# Patient Record
Sex: MALE | Race: BLACK OR AFRICAN AMERICAN | Employment: FULL TIME | URBAN - METROPOLITAN AREA
[De-identification: names, ages, dates, MRNs, and addresses within clinical notes are randomized per-mention and may not be internally consistent; named-entity substitution may affect disease eponyms.]

---

## 2017-02-13 ENCOUNTER — OFFICE VISIT (OUTPATIENT)
Dept: INTERNAL MEDICINE CLINIC | Age: 32
End: 2017-02-13

## 2017-02-13 VITALS
RESPIRATION RATE: 18 BRPM | HEART RATE: 56 BPM | SYSTOLIC BLOOD PRESSURE: 142 MMHG | BODY MASS INDEX: 36.48 KG/M2 | DIASTOLIC BLOOD PRESSURE: 103 MMHG | TEMPERATURE: 96.1 F | WEIGHT: 246.3 LBS | HEIGHT: 69 IN

## 2017-02-13 DIAGNOSIS — E66.01 SEVERE OBESITY (BMI 35.0-35.9 WITH COMORBIDITY) (HCC): ICD-10-CM

## 2017-02-13 DIAGNOSIS — R73.03 PREDIABETES: ICD-10-CM

## 2017-02-13 DIAGNOSIS — I10 UNCONTROLLED HYPERTENSION: Primary | ICD-10-CM

## 2017-02-13 DIAGNOSIS — E78.2 MIXED DYSLIPIDEMIA: ICD-10-CM

## 2017-02-13 DIAGNOSIS — R63.5 WEIGHT GAIN: ICD-10-CM

## 2017-02-13 DIAGNOSIS — E78.1 HIGH TRIGLYCERIDES: ICD-10-CM

## 2017-02-13 LAB
BILIRUB UR QL STRIP: NEGATIVE
GLUCOSE UR-MCNC: NEGATIVE MG/DL
KETONES P FAST UR STRIP-MCNC: NEGATIVE MG/DL
PH UR STRIP: 6 [PH] (ref 4.6–8)
PROT UR QL STRIP: NEGATIVE MG/DL
SP GR UR STRIP: 1.01 (ref 1–1.03)
UA UROBILINOGEN AMB POC: NORMAL (ref 0.2–1)
URINALYSIS CLARITY POC: CLEAR
URINALYSIS COLOR POC: NORMAL
URINE BLOOD POC: NEGATIVE
URINE LEUKOCYTES POC: NEGATIVE
URINE NITRITES POC: NEGATIVE

## 2017-02-13 RX ORDER — METOPROLOL TARTRATE 100 MG/1
TABLET ORAL
Qty: 60 TAB | Refills: 3 | Status: SHIPPED | OUTPATIENT
Start: 2017-02-13 | End: 2018-03-15

## 2017-02-13 RX ORDER — CHLORTHALIDONE 25 MG/1
25 TABLET ORAL DAILY
Qty: 30 TAB | Refills: 3 | Status: SHIPPED | OUTPATIENT
Start: 2017-02-13 | End: 2018-11-12

## 2017-02-13 RX ORDER — ATORVASTATIN CALCIUM 40 MG/1
40 TABLET, FILM COATED ORAL DAILY
Qty: 30 TAB | Refills: 3 | Status: SHIPPED | OUTPATIENT
Start: 2017-02-13 | End: 2018-03-15

## 2017-02-13 RX ORDER — CHLORTHALIDONE 25 MG/1
25 TABLET ORAL DAILY
Qty: 30 TAB | Refills: 3 | Status: CANCELLED | OUTPATIENT
Start: 2017-02-13

## 2017-02-13 RX ORDER — LISINOPRIL 40 MG/1
40 TABLET ORAL DAILY
Qty: 30 TAB | Refills: 3 | Status: SHIPPED | OUTPATIENT
Start: 2017-02-13 | End: 2018-03-15

## 2017-02-13 RX ORDER — AMLODIPINE BESYLATE 10 MG/1
10 TABLET ORAL DAILY
Qty: 30 TAB | Refills: 3 | Status: SHIPPED | OUTPATIENT
Start: 2017-02-13 | End: 2018-03-15

## 2017-02-13 RX ORDER — LISINOPRIL 40 MG/1
40 TABLET ORAL DAILY
Qty: 30 TAB | Refills: 3 | Status: CANCELLED | OUTPATIENT
Start: 2017-02-13

## 2017-02-13 RX ORDER — METOPROLOL TARTRATE 100 MG/1
TABLET ORAL
Qty: 60 TAB | Refills: 3 | Status: CANCELLED | OUTPATIENT
Start: 2017-02-13

## 2017-02-13 RX ORDER — AMLODIPINE BESYLATE 10 MG/1
10 TABLET ORAL DAILY
Qty: 30 TAB | Refills: 3 | Status: CANCELLED | OUTPATIENT
Start: 2017-02-13

## 2017-02-13 NOTE — PROGRESS NOTES
Chief Complaint   Patient presents with    Cholesterol Problem     Rm 7    Hypertension     Pt denies, blurred vision/dizziness/chest pain           Subjective:   Chasidy Cruz. 33 y.o.  male with a  past medical history reviewed see below. comes in today for a bp check   Mild ha 3/10 stress with insurance company took bp meds as directed and drank water he is exercising abit more 1-2 times a week. ROS: otherwise feeling generally well. All other systems reviewed and are negative      Current Outpatient Prescriptions   Medication Sig Dispense Refill    atorvastatin (LIPITOR) 40 mg tablet Take 1 Tab by mouth daily. 30 Tab 3    metoprolol tartrate (LOPRESSOR) 100 mg IR tablet Take 100 in the am and 50 in the pm 60 Tab 3    amLODIPine (NORVASC) 10 mg tablet Take 1 Tab by mouth daily. 30 Tab 3    lisinopril (PRINIVIL, ZESTRIL) 40 mg tablet Take 1 Tab by mouth daily. 30 Tab 3    chlorthalidone (HYGROTEN) 25 mg tablet Take 1 Tab by mouth daily. 30 Tab 3    clotrimazole (LOTRIMIN) 1 % topical cream Apply  to affected area two (2) times a day. 45 g 0     No Known Allergies  Past Medical History:   Diagnosis Date    Hypertension      History reviewed. No pertinent surgical history.   Family History   Problem Relation Age of Onset    Hypertension Mother     Hypertension Father      Social History   Substance Use Topics    Smoking status: Former Smoker     Packs/day: 0.50     Years: 4.00     Quit date: 5/1/2014    Smokeless tobacco: Never Used    Alcohol use Yes      Comment: rarely          Objective:     Visit Vitals    BP (!) 142/103 (BP 1 Location: Left arm, BP Patient Position: Sitting)    Pulse (!) 56    Temp 96.1 °F (35.6 °C) (Oral)    Resp 18    Ht 5' 9\" (1.753 m)    Wt 246 lb 4.8 oz (111.7 kg)    BMI 36.37 kg/m2     Gen: NAD, pleasant  HEENT: normal appearing head, nares patent, PERRLA, EOMI, oropharynx no erythema, no cervical lymphadenopathy neck supple   Cardio: RRR nl S1S2 no murmur  Lungs CTAB no wheeze no rales no rhonchi  ABD Soft non tender non distended + bowel sounds  Extremities: full ROM X 4 no clubbing no cyanosis  Neuro: no gross focal deficits noted, alert and orientated X 3  Psych.: well groomed no outward signs of depression. Assessment/Plan:   Tosha Donahue was seen today for cholesterol problem and hypertension. Diagnoses and all orders for this visit:    Uncontrolled hypertension  -     AMB POC URINALYSIS DIP STICK AUTO W/O MICRO  -     METABOLIC PANEL, COMPREHENSIVE    Mixed dyslipidemia  -     LIPID PANEL  -     METABOLIC PANEL, COMPREHENSIVE  -     TSH 3RD GENERATION    Weight gain    Severe obesity (BMI 35.0-35.9 with comorbidity) ([facility])  -     LIPID PANEL  -     METABOLIC PANEL, COMPREHENSIVE  -     TSH 3RD GENERATION    Prediabetes    High triglycerides  -     atorvastatin (LIPITOR) 40 mg tablet; Take 1 Tab by mouth daily. Other orders  -     Cancel: metoprolol tartrate (LOPRESSOR) 100 mg IR tablet; Take 100 in the am and 50 in the pm  -     Cancel: amLODIPine (NORVASC) 10 mg tablet; Take 1 Tab by mouth daily. -     Cancel: lisinopril (PRINIVIL, ZESTRIL) 40 mg tablet; Take 1 Tab by mouth daily. -     Cancel: chlorthalidone (HYGROTEN) 25 mg tablet; Take 1 Tab by mouth daily. -     metoprolol tartrate (LOPRESSOR) 100 mg IR tablet; Take 100 in the am and 50 in the pm  -     amLODIPine (NORVASC) 10 mg tablet; Take 1 Tab by mouth daily. -     lisinopril (PRINIVIL, ZESTRIL) 40 mg tablet; Take 1 Tab by mouth daily. -     chlorthalidone (HYGROTEN) 25 mg tablet; Take 1 Tab by mouth daily. Follow-up Disposition:  Return in about 2 weeks (around 2/27/2017) for review labs and recheck bp 15 min . Carl Solis avs printed and given to the pt. .    The patient voiced understanding of the above. Medication side effects were reviewed with the patient. Call with any concerns.

## 2017-02-13 NOTE — PROGRESS NOTES
Preethi Carranza. is a 33 y.o. male  Chief Complaint   Patient presents with    Cholesterol Problem     Rm 7    Hypertension     Pt denies, blurred vision/dizziness/chest pain     1. Have you been to an emergency room, urgent clinic, or hospitalized since your last visit? NO  If yes, where when, and reason for visit? 2. Have seen or consulted any other health care provider since your last visit? NO  Please include any pap smears or colon screening in this section  If yes, where when, and reason for visit? 6. Do you have an Advanced Directive/ Living Will in place?  NO  If yes, do we have a copy on file NO  If no, would you like information NO

## 2017-02-13 NOTE — MR AVS SNAPSHOT
Visit Information Date & Time Provider Department Dept. Phone Encounter #  
 2/13/2017  9:00  [facility] [place], [place] [phone] [id number] Follow-up Instructions Return in about 2 weeks (around 2/27/2017) for review labs and recheck bp 15 min . Upcoming Health Maintenance Date Due DTaP/Tdap/Td series (2 - Td) 4/29/2019 Allergies as of 2/13/2017  Review Complete On: 2/13/2017 By: Susanne Stoll LPN No Known Allergies Current Immunizations  Reviewed on 11/10/2016 Name Date Tdap 4/29/2009 Not reviewed this visit You Were Diagnosed With   
  
 Codes Comments Uncontrolled hypertension    -  Primary ICD-10-CM: I10 
ICD-9-CM: 401.9 Mixed dyslipidemia     ICD-10-CM: E78.2 ICD-9-CM: 272.2 Weight gain     ICD-10-CM: R63.5 ICD-9-CM: 783.1 Severe obesity (BMI 35.0-35.9 with comorbidity) (HCC)     ICD-10-CM: E66.01, Z68.35 ICD-9-CM: 278.01, V85.35 Prediabetes     ICD-10-CM: R73.03 
ICD-9-CM: 790.29 Vitals BP Pulse Temp Resp Height(growth percentile) Weight(growth percentile) (!) 142/103 (BP 1 Location: Left arm, BP Patient Position: Sitting) (!) 56 96.1 °F (35.6 °C) (Oral) 18 5' 9\" (1.753 m) 246 lb 4.8 oz (111.7 kg) BMI Smoking Status 36.37 kg/m2 Former Smoker Vitals History BMI and BSA Data Body Mass Index Body Surface Area  
 36.37 kg/m 2 2.33 m 2 Preferred Pharmacy Pharmacy Name Phone [facility] [address] [place] [address] [phone] Your Updated Medication List  
  
   
This list is accurate as of: 2/13/17 10:22 AM.  Always use your most recent med list. amLODIPine 10 mg tablet Commonly known as:  Colin Chafe Take 1 Tab by mouth daily. atorvastatin 40 mg tablet Commonly known as:  LIPITOR Take 1 Tab by mouth daily. chlorthalidone 25 mg tablet Commonly known as:  Saeed Males Take 1 Tab by mouth daily. clotrimazole 1 % topical cream  
Commonly known as:  Colby Hathawayn Apply  to affected area two (2) times a day. lisinopril 40 mg tablet Commonly known as:  Judi Needy Take 1 Tab by mouth daily. metoprolol tartrate 100 mg IR tablet Commonly known as:  LOPRESSOR Take 100 in the am and 50 in the pm  
  
  
  
  
We Performed the Following AMB POC URINALYSIS DIP STICK AUTO W/O MICRO [70165 CPT(R)] LIPID PANEL [86840 CPT(R)] METABOLIC PANEL, COMPREHENSIVE [71765 CPT(R)] TSH 3RD GENERATION [14967 CPT(R)] Follow-up Instructions Return in about 2 weeks (around 2/27/2017) for review labs and recheck bp 15 min . Introducing [facility]! Dear Adam Moore: Thank you for requesting a Evolita account. Our records indicate that you have previously registered for a Evolita account but its currently inactive. Please call our Evolita support line at [phone]. Additional Information If you have questions, please visit the Frequently Asked Questions section of the Evolita website at [email or web address] Remember, Evolita is NOT to be used for urgent needs. For medical emergencies, dial 911. Now available from your iPhone and Android! Please provide this summary of care documentation to your next provider. Your primary care clinician is listed as Dayday Napoles. If you have any questions after today's visit, please call [phone].

## 2017-02-27 ENCOUNTER — OFFICE VISIT (OUTPATIENT)
Dept: INTERNAL MEDICINE CLINIC | Age: 32
End: 2017-02-27

## 2017-02-27 VITALS
RESPIRATION RATE: 16 BRPM | HEART RATE: 70 BPM | TEMPERATURE: 98.2 F | OXYGEN SATURATION: 98 % | WEIGHT: 246.5 LBS | SYSTOLIC BLOOD PRESSURE: 145 MMHG | HEIGHT: 69 IN | BODY MASS INDEX: 36.51 KG/M2 | DIASTOLIC BLOOD PRESSURE: 105 MMHG

## 2017-02-27 DIAGNOSIS — F12.90 MARIJUANA SMOKER, EPISODIC: ICD-10-CM

## 2017-02-27 DIAGNOSIS — R94.31 ELECTROCARDIOGRAM SHOWING T WAVE ABNORMALITIES: ICD-10-CM

## 2017-02-27 DIAGNOSIS — I10 UNCONTROLLED HYPERTENSION: Primary | ICD-10-CM

## 2017-02-27 DIAGNOSIS — E66.01 SEVERE OBESITY (BMI 35.0-35.9 WITH COMORBIDITY) (HCC): ICD-10-CM

## 2017-02-27 LAB
BUN SERPL-MCNC: 12 MG/DL (ref 6–20)
BUN/CREAT SERPL: 10 (ref 8–19)
CALCIUM SERPL-MCNC: 9 MG/DL (ref 8.7–10.2)
CHLORIDE SERPL-SCNC: 98 MMOL/L (ref 96–106)
CO2 SERPL-SCNC: 25 MMOL/L (ref 18–29)
CREAT SERPL-MCNC: 1.15 MG/DL (ref 0.76–1.27)
GLUCOSE SERPL-MCNC: 91 MG/DL (ref 65–99)
POTASSIUM SERPL-SCNC: 3.9 MMOL/L (ref 3.5–5.2)
SODIUM SERPL-SCNC: 139 MMOL/L (ref 134–144)

## 2017-02-27 NOTE — PATIENT INSTRUCTIONS

## 2017-02-27 NOTE — PROGRESS NOTES
Chief Complaint   Patient presents with    Blood Pressure Check       Subjective:   Josr Carvalho. 33 y.o.  male with a  past medical history reviewed see below. comes in today for a bp check. Her for a bp check not using medication as directed and has missed a several doses of medication. He has not been doing th dash diet   He has also been drinking beer had three yesterday. ROS: otherwise feeling generally well. All other systems reviewed and are negative    Current Outpatient Prescriptions   Medication Sig Dispense Refill    atorvastatin (LIPITOR) 40 mg tablet Take 1 Tab by mouth daily. 30 Tab 3    metoprolol tartrate (LOPRESSOR) 100 mg IR tablet Take 100 in the am and 50 in the pm 60 Tab 3    amLODIPine (NORVASC) 10 mg tablet Take 1 Tab by mouth daily. 30 Tab 3    lisinopril (PRINIVIL, ZESTRIL) 40 mg tablet Take 1 Tab by mouth daily. 30 Tab 3    chlorthalidone (HYGROTEN) 25 mg tablet Take 1 Tab by mouth daily. 30 Tab 3    clotrimazole (LOTRIMIN) 1 % topical cream Apply  to affected area two (2) times a day. 45 g 0     No Known Allergies  Past Medical History:   Diagnosis Date    Hypertension      History reviewed. No pertinent surgical history.   Family History   Problem Relation Age of Onset    Hypertension Mother     Hypertension Father      Social History   Substance Use Topics    Smoking status: Former Smoker     Packs/day: 0.50     Years: 4.00     Quit date: 5/1/2014    Smokeless tobacco: Never Used    Alcohol use Yes      Comment: rarely          Objective:     Visit Vitals    BP (!) 145/105 (BP 1 Location: Left arm, BP Patient Position: Sitting)    Pulse 70    Temp 98.2 °F (36.8 °C) (Oral)    Resp 16    Ht 5' 9\" (1.753 m)    Wt 246 lb 8 oz (111.8 kg)    SpO2 98%    BMI 36.4 kg/m2     Gen: NAD, pleasant  HEENT: normal appearing head, nares patent, PERRLA, EOMI, oropharynx no erythema, no cervical lymphadenopathy neck supple   Cardio: RRR nl S1S2 no murmur  Lungs CTAB no wheeze no rales no rhonchi  ABD Soft non tender non distended + bowel sounds  Extremities: full ROM X 4 no clubbing no cyanosis  Neuro: no gross focal deficits noted, alert and orientated X 3  Psych.: well groomed no outward signs of depression. Assessment/Plan:   Josue Schafer was seen today for blood pressure check. Diagnoses and all orders for this visit:    Uncontrolled hypertension  -     AMB POC EKG ROUTINE W/ 12 LEADS, INTER & REP  -     UA/M W/RFLX CULTURE, COMP  -     METABOLIC PANEL, BASIC    Severe obesity (BMI 35.0-35.9 with comorbidity) ([facility])  -     AMB POC EKG ROUTINE W/ 12 LEADS, INTER & REP    Marijuana smoker, episodic ([facility])    Electrocardiogram showing T wave abnormalities  -     METABOLIC PANEL, BASIC      Follow-up Disposition:  Return in about 2 days (around 3/1/2017) for review labs and recheck bp 15 min . Carlos Lemons avs printed and given to the pt. .    The patient voiced understanding of the above. Medication side effects were reviewed with the patient. Call with any concerns.

## 2017-02-27 NOTE — MR AVS SNAPSHOT
Visit Information Date & Time Provider Department Dept. Phone Encounter #  
 2/27/2017  9:30  [facility] [place], [facility] [phone] [id number] Follow-up Instructions Return in about 2 days (around 3/1/2017) for review labs and recheck bp 15 min . Upcoming Health Maintenance Date Due DTaP/Tdap/Td series (2 - Td) 4/29/2019 Allergies as of 2/27/2017  Review Complete On: 2/27/2017 By: Bhumi Ferrara No Known Allergies Current Immunizations  Reviewed on 11/10/2016 Name Date Tdap 4/29/2009 Not reviewed this visit You Were Diagnosed With   
  
 Codes Comments Uncontrolled hypertension    -  Primary ICD-10-CM: I10 
ICD-9-CM: 401.9 Severe obesity (BMI 35.0-35.9 with comorbidity) (HCC)     ICD-10-CM: E66.01, Z68.35 ICD-9-CM: 278.01, V85.35 Marijuana smoker, episodic ([facility] [address]     ICD-10-CM: F12.20 ICD-9-CM: 305.22 Electrocardiogram showing T wave abnormalities     ICD-10-CM: R94.31 
ICD-9-CM: 794.31 Vitals BP  
  
  
  
  
  
 (!) 145/105 (BP 1 Location: Left arm, BP Patient Position: Sitting) Vitals History BMI and BSA Data Body Mass Index Body Surface Area  
 36.4 kg/m 2 2.33 m 2 Preferred Pharmacy Pharmacy Name Phone [facility] [address] [phone] Your Updated Medication List  
  
   
This list is accurate as of: 2/27/17 10:41 AM.  Always use your most recent med list. amLODIPine 10 mg tablet Commonly known as:  Yue Citron Take 1 Tab by mouth daily. atorvastatin 40 mg tablet Commonly known as:  LIPITOR Take 1 Tab by mouth daily. chlorthalidone 25 mg tablet Commonly known as:  Marnette Castellani Take 1 Tab by mouth daily. clotrimazole 1 % topical cream  
Commonly known as:  Kristofer Frames Apply  to affected area two (2) times a day. lisinopril 40 mg tablet Commonly known as:  Alida Queenie Take 1 Tab by mouth daily. metoprolol tartrate 100 mg IR tablet Commonly known as:  LOPRESSOR Take 100 in the am and 50 in the pm  
  
  
  
  
We Performed the Following AMB POC EKG ROUTINE W/ 12 LEADS, INTER & REP [13774 CPT(R)] METABOLIC PANEL, BASIC [49332 CPT(R)] UA/M W/RFLX CULTURE, COMP [17283 CPT(R)] Follow-up Instructions Return in about 2 days (around 3/1/2017) for review labs and recheck bp 15 min . Patient Instructions DASH Diet: Care Instructions Your Care Instructions The DASH diet is an eating plan that can help lower your blood pressure. DASH stands for Dietary Approaches to Stop Hypertension. Hypertension is high blood pressure. The DASH diet focuses on eating foods that are high in calcium, potassium, and magnesium. These nutrients can lower blood pressure. The foods that are highest in these nutrients are fruits, vegetables, low-fat dairy products, nuts, seeds, and legumes. But taking calcium, potassium, and magnesium supplements instead of eating foods that are high in those nutrients does not have the same effect. The DASH diet also includes whole grains, fish, and poultry. The DASH diet is one of several lifestyle changes your doctor may recommend to lower your high blood pressure. Your doctor may also want you to decrease the amount of sodium in your diet. Lowering sodium while following the DASH diet can lower blood pressure even further than just the DASH diet alone. Follow-up care is a key part of your treatment and safety. Be sure to make and go to all appointments, and call your doctor if you are having problems. It's also a good idea to know your test results and keep a list of the medicines you take. How can you care for yourself at home? Following the DASH diet · Eat 4 to 5 servings of fruit each day.  A serving is 1 medium-sized piece of fruit, ½ cup chopped or canned fruit, 1/4 cup dried fruit, or 4 ounces (½ cup) of fruit juice. Choose fruit more often than fruit juice. · Eat 4 to 5 servings of vegetables each day. A serving is 1 cup of lettuce or raw leafy vegetables, ½ cup of chopped or cooked vegetables, or 4 ounces (½ cup) of vegetable juice. Choose vegetables more often than vegetable juice. · Get 2 to 3 servings of low-fat and fat-free dairy each day. A serving is 8 ounces of milk, 1 cup of yogurt, or 1 ½ ounces of cheese. · Eat 6 to 8 servings of grains each day. A serving is 1 slice of bread, 1 ounce of dry cereal, or ½ cup of cooked rice, pasta, or cooked cereal. Try to choose whole-grain products as much as possible. · Limit lean meat, poultry, and fish to 2 servings each day. A serving is 3 ounces, about the size of a deck of cards. · Eat 4 to 5 servings of nuts, seeds, and legumes (cooked dried beans, lentils, and split peas) each week. A serving is 1/3 cup of nuts, 2 tablespoons of seeds, or ½ cup of cooked beans or peas. · Limit fats and oils to 2 to 3 servings each day. A serving is 1 teaspoon of vegetable oil or 2 tablespoons of salad dressing. · Limit sweets and added sugars to 5 servings or less a week. A serving is 1 tablespoon jelly or jam, ½ cup sorbet, or 1 cup of lemonade. · Eat less than 2,300 milligrams (mg) of sodium a day. If you limit your sodium to 1,500 mg a day, you can lower your blood pressure even more. Tips for success · Start small. Do not try to make dramatic changes to your diet all at once. You might feel that you are missing out on your favorite foods and then be more likely to not follow the plan. Make small changes, and stick with them. Once those changes become habit, add a few more changes. · Try some of the following: ¨ Make it a goal to eat a fruit or vegetable at every meal and at snacks. This will make it easy to get the recommended amount of fruits and vegetables each day. ¨ Try yogurt topped with fruit and nuts for a snack or healthy dessert. ¨ Add lettuce, tomato, cucumber, and onion to sandwiches. ¨ Combine a ready-made pizza crust with low-fat mozzarella cheese and lots of vegetable toppings. Try using tomatoes, squash, spinach, broccoli, carrots, cauliflower, and onions. ¨ Have a variety of cut-up vegetables with a low-fat dip as an appetizer instead of chips and dip. ¨ Sprinkle sunflower seeds or chopped almonds over salads. Or try adding chopped walnuts or almonds to cooked vegetables. ¨ Try some vegetarian meals using beans and peas. Add garbanzo or kidney beans to salads. Make burritos and tacos with mashed serra beans or black beans. Where can you learn more? Go to http://nakul-chel.info/. Enter X737 in the search box to learn more about \"DASH Diet: Care Instructions. \" Current as of: March 23, 2016 Content Version: 11.1 © 6434-5824 [employer] Hitch. Care instructions adapted under license by [employer] (which disclaims liability or warranty for this information). If you have questions about a medical condition or this instruction, always ask your healthcare professional. [address] any warranty or liability for your use of this information. Introducing hospitals & HEALTH SERVICES! Dear Marissa Razo: Thank you for requesting a Hashable account. Our records indicate that you have previously registered for a Hashable account but its currently inactive. Please call our Hashable support line at [phone]. Additional Information If you have questions, please visit the Frequently Asked Questions section of the Hashable website at [email or web address] Remember, Hashable is NOT to be used for urgent needs. For medical emergencies, dial 911. Now available from your iPhone and Android! Please provide this summary of care documentation to your next provider. Your primary care clinician is listed as Neha Ascencio. If you have any questions after today's visit, please call [phone].

## 2017-02-28 LAB
APPEARANCE UR: CLEAR
BACTERIA #/AREA URNS HPF: NORMAL /[HPF]
BILIRUB UR QL STRIP: NEGATIVE
CASTS URNS QL MICRO: NORMAL /LPF
COLOR UR: YELLOW
EPI CELLS #/AREA URNS HPF: NORMAL /HPF
GLUCOSE UR QL: NEGATIVE
HGB UR QL STRIP: NEGATIVE
KETONES UR QL STRIP: NEGATIVE
LEUKOCYTE ESTERASE UR QL STRIP: NEGATIVE
MICRO URNS: NORMAL
MICRO URNS: NORMAL
NITRITE UR QL STRIP: NEGATIVE
PH UR STRIP: 7 [PH] (ref 5–7.5)
PROT UR QL STRIP: NORMAL
RBC #/AREA URNS HPF: NORMAL /HPF
SP GR UR: 1.01 (ref 1–1.03)
URINALYSIS REFLEX , 377201: NORMAL
UROBILINOGEN UR STRIP-MCNC: 0.2 MG/DL (ref 0.2–1)
WBC #/AREA URNS HPF: NORMAL /HPF

## 2018-03-15 ENCOUNTER — APPOINTMENT (OUTPATIENT)
Dept: CT IMAGING | Age: 33
End: 2018-03-15
Attending: EMERGENCY MEDICINE
Payer: SELF-PAY

## 2018-03-15 ENCOUNTER — HOSPITAL ENCOUNTER (EMERGENCY)
Age: 33
Discharge: HOME | End: 2018-03-16
Attending: EMERGENCY MEDICINE
Payer: SELF-PAY

## 2018-03-15 VITALS
WEIGHT: 232.14 LBS | TEMPERATURE: 98.9 F | SYSTOLIC BLOOD PRESSURE: 176 MMHG | DIASTOLIC BLOOD PRESSURE: 108 MMHG | HEIGHT: 69 IN | BODY MASS INDEX: 34.38 KG/M2 | HEART RATE: 92 BPM | RESPIRATION RATE: 20 BRPM | OXYGEN SATURATION: 98 %

## 2018-03-15 DIAGNOSIS — E83.42 HYPOMAGNESEMIA: ICD-10-CM

## 2018-03-15 DIAGNOSIS — E27.8 ADRENAL NODULE (HCC): ICD-10-CM

## 2018-03-15 DIAGNOSIS — I10 ACCELERATED HYPERTENSION: ICD-10-CM

## 2018-03-15 DIAGNOSIS — E87.6 HYPOKALEMIA: ICD-10-CM

## 2018-03-15 DIAGNOSIS — R10.9 ACUTE ABDOMINAL PAIN: Primary | ICD-10-CM

## 2018-03-15 DIAGNOSIS — E78.1 HIGH TRIGLYCERIDES: ICD-10-CM

## 2018-03-15 DIAGNOSIS — R11.2 NAUSEA AND VOMITING, INTRACTABILITY OF VOMITING NOT SPECIFIED, UNSPECIFIED VOMITING TYPE: ICD-10-CM

## 2018-03-15 LAB
ALBUMIN SERPL-MCNC: 3.9 G/DL (ref 3.5–5)
ALBUMIN/GLOB SERPL: 0.9 {RATIO} (ref 1.1–2.2)
ALP SERPL-CCNC: 63 U/L (ref 45–117)
ALT SERPL-CCNC: 27 U/L (ref 12–78)
ANION GAP SERPL CALC-SCNC: 7 MMOL/L (ref 5–15)
APPEARANCE UR: CLEAR
AST SERPL-CCNC: 22 U/L (ref 15–37)
BACTERIA URNS QL MICRO: NEGATIVE /HPF
BASOPHILS # BLD: 0 K/UL (ref 0–0.1)
BASOPHILS NFR BLD: 0 % (ref 0–1)
BILIRUB SERPL-MCNC: 0.5 MG/DL (ref 0.2–1)
BILIRUB UR QL: NEGATIVE
BUN SERPL-MCNC: 10 MG/DL (ref 6–20)
BUN/CREAT SERPL: 8 (ref 12–20)
CALCIUM SERPL-MCNC: 9.2 MG/DL (ref 8.5–10.1)
CHLORIDE SERPL-SCNC: 99 MMOL/L (ref 97–108)
CO2 SERPL-SCNC: 29 MMOL/L (ref 21–32)
COLOR UR: ABNORMAL
CREAT SERPL-MCNC: 1.25 MG/DL (ref 0.7–1.3)
DIFFERENTIAL METHOD BLD: ABNORMAL
EOSINOPHIL # BLD: 0 K/UL (ref 0–0.4)
EOSINOPHIL NFR BLD: 0 % (ref 0–7)
EPITH CASTS URNS QL MICRO: ABNORMAL /LPF
ERYTHROCYTE [DISTWIDTH] IN BLOOD BY AUTOMATED COUNT: 12 % (ref 11.5–14.5)
GLOBULIN SER CALC-MCNC: 4.2 G/DL (ref 2–4)
GLUCOSE SERPL-MCNC: 113 MG/DL (ref 65–100)
GLUCOSE UR STRIP.AUTO-MCNC: NEGATIVE MG/DL
HCT VFR BLD AUTO: 45.9 % (ref 36.6–50.3)
HGB BLD-MCNC: 15.8 G/DL (ref 12.1–17)
HGB UR QL STRIP: NEGATIVE
HYALINE CASTS URNS QL MICRO: ABNORMAL /LPF (ref 0–5)
IMM GRANULOCYTES # BLD: 0.1 K/UL (ref 0–0.04)
IMM GRANULOCYTES NFR BLD AUTO: 1 % (ref 0–0.5)
KETONES UR QL STRIP.AUTO: NEGATIVE MG/DL
LACTATE SERPL-SCNC: 1.9 MMOL/L (ref 0.4–2)
LEUKOCYTE ESTERASE UR QL STRIP.AUTO: NEGATIVE
LIPASE SERPL-CCNC: 345 U/L (ref 73–393)
LYMPHOCYTES # BLD: 0.6 K/UL (ref 0.8–3.5)
LYMPHOCYTES NFR BLD: 7 % (ref 12–49)
MAGNESIUM SERPL-MCNC: 1.5 MG/DL (ref 1.6–2.4)
MCH RBC QN AUTO: 28.1 PG (ref 26–34)
MCHC RBC AUTO-ENTMCNC: 34.4 G/DL (ref 30–36.5)
MCV RBC AUTO: 81.7 FL (ref 80–99)
MONOCYTES # BLD: 0.3 K/UL (ref 0–1)
MONOCYTES NFR BLD: 4 % (ref 5–13)
NEUTS SEG # BLD: 7.4 K/UL (ref 1.8–8)
NEUTS SEG NFR BLD: 88 % (ref 32–75)
NITRITE UR QL STRIP.AUTO: NEGATIVE
NRBC # BLD: 0 K/UL (ref 0–0.01)
NRBC BLD-RTO: 0 PER 100 WBC
PH UR STRIP: 6.5 [PH] (ref 5–8)
PLATELET # BLD AUTO: 256 K/UL (ref 150–400)
PMV BLD AUTO: 10.5 FL (ref 8.9–12.9)
POTASSIUM SERPL-SCNC: 3 MMOL/L (ref 3.5–5.1)
POTASSIUM SERPL-SCNC: 3.3 MMOL/L (ref 3.5–5.1)
PROT SERPL-MCNC: 8.1 G/DL (ref 6.4–8.2)
PROT UR STRIP-MCNC: ABNORMAL MG/DL
RBC # BLD AUTO: 5.62 M/UL (ref 4.1–5.7)
RBC #/AREA URNS HPF: ABNORMAL /HPF (ref 0–5)
RBC MORPH BLD: ABNORMAL
SODIUM SERPL-SCNC: 135 MMOL/L (ref 136–145)
SP GR UR REFRACTOMETRY: 1.02 (ref 1–1.03)
UROBILINOGEN UR QL STRIP.AUTO: 0.2 EU/DL (ref 0.2–1)
WBC # BLD AUTO: 8.4 K/UL (ref 4.1–11.1)
WBC URNS QL MICRO: ABNORMAL /HPF (ref 0–4)

## 2018-03-15 PROCEDURE — 83735 ASSAY OF MAGNESIUM: CPT | Performed by: EMERGENCY MEDICINE

## 2018-03-15 PROCEDURE — 84132 ASSAY OF SERUM POTASSIUM: CPT | Performed by: EMERGENCY MEDICINE

## 2018-03-15 PROCEDURE — 83690 ASSAY OF LIPASE: CPT | Performed by: EMERGENCY MEDICINE

## 2018-03-15 PROCEDURE — 81001 URINALYSIS AUTO W/SCOPE: CPT | Performed by: EMERGENCY MEDICINE

## 2018-03-15 PROCEDURE — 85025 COMPLETE CBC W/AUTO DIFF WBC: CPT | Performed by: EMERGENCY MEDICINE

## 2018-03-15 PROCEDURE — 96376 TX/PRO/DX INJ SAME DRUG ADON: CPT

## 2018-03-15 PROCEDURE — 99283 EMERGENCY DEPT VISIT LOW MDM: CPT

## 2018-03-15 PROCEDURE — 80053 COMPREHEN METABOLIC PANEL: CPT | Performed by: EMERGENCY MEDICINE

## 2018-03-15 PROCEDURE — 96365 THER/PROPH/DIAG IV INF INIT: CPT

## 2018-03-15 PROCEDURE — 74011250636 HC RX REV CODE- 250/636: Performed by: EMERGENCY MEDICINE

## 2018-03-15 PROCEDURE — 74011000250 HC RX REV CODE- 250: Performed by: EMERGENCY MEDICINE

## 2018-03-15 PROCEDURE — 74011636320 HC RX REV CODE- 636/320: Performed by: EMERGENCY MEDICINE

## 2018-03-15 PROCEDURE — 74177 CT ABD & PELVIS W/CONTRAST: CPT

## 2018-03-15 PROCEDURE — 96361 HYDRATE IV INFUSION ADD-ON: CPT

## 2018-03-15 PROCEDURE — 96375 TX/PRO/DX INJ NEW DRUG ADDON: CPT

## 2018-03-15 PROCEDURE — 83605 ASSAY OF LACTIC ACID: CPT | Performed by: EMERGENCY MEDICINE

## 2018-03-15 PROCEDURE — 36415 COLL VENOUS BLD VENIPUNCTURE: CPT | Performed by: EMERGENCY MEDICINE

## 2018-03-15 RX ORDER — MAGNESIUM SULFATE HEPTAHYDRATE 40 MG/ML
2 INJECTION, SOLUTION INTRAVENOUS
Status: COMPLETED | OUTPATIENT
Start: 2018-03-15 | End: 2018-03-16

## 2018-03-15 RX ORDER — FENTANYL CITRATE 50 UG/ML
100 INJECTION, SOLUTION INTRAMUSCULAR; INTRAVENOUS
Status: COMPLETED | OUTPATIENT
Start: 2018-03-15 | End: 2018-03-15

## 2018-03-15 RX ORDER — METOPROLOL TARTRATE 100 MG/1
TABLET ORAL
Qty: 60 TAB | Refills: 0 | Status: SHIPPED | OUTPATIENT
Start: 2018-03-15 | End: 2018-11-12

## 2018-03-15 RX ORDER — LISINOPRIL 40 MG/1
40 TABLET ORAL DAILY
Qty: 30 TAB | Refills: 0 | Status: SHIPPED | OUTPATIENT
Start: 2018-03-15 | End: 2018-11-12

## 2018-03-15 RX ORDER — SODIUM CHLORIDE 9 MG/ML
50 INJECTION, SOLUTION INTRAVENOUS
Status: COMPLETED | OUTPATIENT
Start: 2018-03-15 | End: 2018-03-16

## 2018-03-15 RX ORDER — POTASSIUM CHLORIDE 750 MG/1
10 TABLET, FILM COATED, EXTENDED RELEASE ORAL 2 TIMES DAILY
Qty: 10 TAB | Refills: 0 | Status: SHIPPED | OUTPATIENT
Start: 2018-03-15 | End: 2018-03-15

## 2018-03-15 RX ORDER — ONDANSETRON 4 MG/1
4 TABLET, ORALLY DISINTEGRATING ORAL
Qty: 10 TAB | Refills: 0 | Status: SHIPPED | OUTPATIENT
Start: 2018-03-15 | End: 2018-03-15

## 2018-03-15 RX ORDER — AMLODIPINE BESYLATE 10 MG/1
10 TABLET ORAL DAILY
Qty: 30 TAB | Refills: 3 | Status: SHIPPED | OUTPATIENT
Start: 2018-03-15 | End: 2018-11-12

## 2018-03-15 RX ORDER — POTASSIUM CHLORIDE 750 MG/1
10 TABLET, FILM COATED, EXTENDED RELEASE ORAL 2 TIMES DAILY
Qty: 10 TAB | Refills: 0 | Status: SHIPPED | OUTPATIENT
Start: 2018-03-15 | End: 2018-11-12

## 2018-03-15 RX ORDER — POTASSIUM CHLORIDE 1.5 G/1.77G
40 POWDER, FOR SOLUTION ORAL
Status: COMPLETED | OUTPATIENT
Start: 2018-03-16 | End: 2018-03-16

## 2018-03-15 RX ORDER — HYDROCHLOROTHIAZIDE 12.5 MG/1
12.5 CAPSULE ORAL DAILY
Qty: 30 CAP | Refills: 0 | Status: SHIPPED | OUTPATIENT
Start: 2018-03-15 | End: 2018-11-12

## 2018-03-15 RX ORDER — ATORVASTATIN CALCIUM 40 MG/1
40 TABLET, FILM COATED ORAL DAILY
Qty: 30 TAB | Refills: 0 | Status: SHIPPED | OUTPATIENT
Start: 2018-03-15 | End: 2018-11-12

## 2018-03-15 RX ORDER — AMLODIPINE BESYLATE 10 MG/1
10 TABLET ORAL DAILY
Qty: 30 TAB | Refills: 3 | Status: SHIPPED | OUTPATIENT
Start: 2018-03-15 | End: 2018-03-15

## 2018-03-15 RX ORDER — HYDROCODONE BITARTRATE AND ACETAMINOPHEN 5; 325 MG/1; MG/1
1 TABLET ORAL
Qty: 20 TAB | Refills: 0 | Status: SHIPPED | OUTPATIENT
Start: 2018-03-15 | End: 2018-11-12

## 2018-03-15 RX ORDER — ONDANSETRON 2 MG/ML
4 INJECTION INTRAMUSCULAR; INTRAVENOUS
Status: COMPLETED | OUTPATIENT
Start: 2018-03-15 | End: 2018-03-15

## 2018-03-15 RX ORDER — HYDROCHLOROTHIAZIDE 12.5 MG/1
12.5 CAPSULE ORAL DAILY
COMMUNITY
End: 2018-03-15

## 2018-03-15 RX ORDER — SODIUM CHLORIDE 0.9 % (FLUSH) 0.9 %
10 SYRINGE (ML) INJECTION
Status: COMPLETED | OUTPATIENT
Start: 2018-03-15 | End: 2018-03-15

## 2018-03-15 RX ORDER — LABETALOL HYDROCHLORIDE 5 MG/ML
10 INJECTION, SOLUTION INTRAVENOUS
Status: COMPLETED | OUTPATIENT
Start: 2018-03-15 | End: 2018-03-15

## 2018-03-15 RX ORDER — LABETALOL HYDROCHLORIDE 5 MG/ML
5 INJECTION, SOLUTION INTRAVENOUS
Status: COMPLETED | OUTPATIENT
Start: 2018-03-15 | End: 2018-03-15

## 2018-03-15 RX ADMIN — Medication 10 ML: at 22:28

## 2018-03-15 RX ADMIN — ONDANSETRON 4 MG: 2 INJECTION INTRAMUSCULAR; INTRAVENOUS at 20:56

## 2018-03-15 RX ADMIN — FENTANYL CITRATE 100 MCG: 50 INJECTION, SOLUTION INTRAMUSCULAR; INTRAVENOUS at 20:55

## 2018-03-15 RX ADMIN — SODIUM CHLORIDE 50 ML/HR: 900 INJECTION, SOLUTION INTRAVENOUS at 22:28

## 2018-03-15 RX ADMIN — LABETALOL HYDROCHLORIDE 5 MG: 5 INJECTION INTRAVENOUS at 20:55

## 2018-03-15 RX ADMIN — MAGNESIUM SULFATE HEPTAHYDRATE 2 G: 40 INJECTION, SOLUTION INTRAVENOUS at 23:37

## 2018-03-15 RX ADMIN — IOPAMIDOL 100 ML: 755 INJECTION, SOLUTION INTRAVENOUS at 22:28

## 2018-03-15 RX ADMIN — DIATRIZOATE MEGLUMINE AND DIATRIZOATE SODIUM 30 ML: 660; 100 LIQUID ORAL; RECTAL at 20:55

## 2018-03-15 RX ADMIN — SODIUM CHLORIDE 500 ML: 900 INJECTION, SOLUTION INTRAVENOUS at 21:39

## 2018-03-15 RX ADMIN — LABETALOL HYDROCHLORIDE 10 MG: 5 INJECTION INTRAVENOUS at 21:39

## 2018-03-15 NOTE — ED NOTES
Pt reporting has been having RLQ abdominal pain since last night with associated n/v/d. Is currently a dull 5/10. Reporting that he has also had associated chills. Denies any fevers. Has vomited  4-5 times.

## 2018-03-15 NOTE — ED PROVIDER NOTES
EMERGENCY DEPARTMENT HISTORY AND PHYSICAL EXAM      Date: 3/15/2018  Patient Name: Ezra Peterson. History of Presenting Illness     Chief Complaint   Patient presents with    Abdominal Pain     with nausea & vomiting since last pm       History Provided By: Patient    HPI: Ezra Peterson., 27 y.o. male with PMHx significant for HTN, presents ambulatory to the ED with cc of a constant, 5/10 worsening to 7/10, central abdominal pain since last night. He reports associated symptoms of N/V/D and intermittent chills. He states he has had 5 episodes of emesis and 1 episode of a loose BM. Pt endorses tobacco use but denies any alcohol use. He denies a FHx of any issues. Pt denies any prior abdominal surgeries. He denies being on an anticoagulant. Pt denies back pain, fever, urinary sxs, CP, SOB, HA, or blurred vision. Of note, the pt's blood pressure is high in the ED and he states he has a h/o HTN, but denies being medicated for it. PCP: Denise Olguin MD    There are no other complaints, changes, or physical findings at this time. Current Facility-Administered Medications   Medication Dose Route Frequency Provider Last Rate Last Dose    magnesium sulfate 2 g/50 ml IVPB (premix or compounded)  2 g IntraVENous NOW Denise Washington MD 50 mL/hr at 03/15/18 2337 2 g at 03/15/18 2337    potassium chloride (KLOR-CON) packet 40 mEq  40 mEq Oral NOW Denise Washington MD         Current Outpatient Prescriptions   Medication Sig Dispense Refill    HYDROcodone-acetaminophen (NORCO) 5-325 mg per tablet Take 1 Tab by mouth every four (4) hours as needed for Pain. Max Daily Amount: 6 Tabs. 20 Tab 0    hydroCHLOROthiazide (MICROZIDE) 12.5 mg capsule Take 1 Cap by mouth daily. 30 Cap 0    amLODIPine (NORVASC) 10 mg tablet Take 1 Tab by mouth daily. 30 Tab 3    atorvastatin (LIPITOR) 40 mg tablet Take 1 Tab by mouth daily.  30 Tab 0    metoprolol tartrate (LOPRESSOR) 100 mg IR tablet Take 100 in the am and 50 in the pm 60 Tab 0    lisinopril (PRINIVIL, ZESTRIL) 40 mg tablet Take 1 Tab by mouth daily. 30 Tab 0    potassium chloride SR (KLOR-CON 10) 10 mEq tablet Take 1 Tab by mouth two (2) times a day. 10 Tab 0    chlorthalidone (HYGROTEN) 25 mg tablet Take 1 Tab by mouth daily. 30 Tab 3       Past History     Past Medical History:  Past Medical History:   Diagnosis Date    Hypertension        Past Surgical History:  History reviewed. No pertinent surgical history. Family History:  Family History   Problem Relation Age of Onset    Hypertension Mother     Hypertension Father        Social History:  Social History   Substance Use Topics    Smoking status: Former Smoker     Packs/day: 0.50     Years: 4.00     Quit date: 5/1/2014    Smokeless tobacco: Never Used    Alcohol use Yes      Comment: rarely       Allergies:  No Known Allergies      Review of Systems   Review of Systems   Constitutional: Positive for chills. Negative for fever. HENT: Negative for congestion. Eyes: Negative for visual disturbance (-blurred vision). Respiratory: Negative for shortness of breath. Cardiovascular: Negative for chest pain. Gastrointestinal: Positive for abdominal pain, diarrhea, nausea and vomiting. Endocrine: Negative for heat intolerance. Genitourinary: Negative for difficulty urinating. Musculoskeletal: Negative for back pain. Skin: Negative for rash. Allergic/Immunologic: Negative for immunocompromised state. Neurological: Negative for headaches. Hematological: Does not bruise/bleed easily. Psychiatric/Behavioral: Negative. All other systems reviewed and are negative. Physical Exam   Physical Exam   Constitutional: He is oriented to person, place, and time. He appears well-developed and well-nourished. Moderate distress   HENT:   Head: Normocephalic and atraumatic. Eyes: EOM are normal. Pupils are equal, round, and reactive to light. Neck: Normal range of motion.  Neck supple. Cardiovascular: Normal rate, regular rhythm and normal heart sounds. Pulmonary/Chest: Effort normal and breath sounds normal. He has no wheezes. Abdominal: Soft. Bowel sounds are normal. There is tenderness in the right upper quadrant, periumbilical area and left upper quadrant. Musculoskeletal: Normal range of motion. He exhibits no edema or tenderness. Neurological: He is alert and oriented to person, place, and time. No cranial nerve deficit. Skin: Skin is warm and dry. Psychiatric: He has a normal mood and affect. His behavior is normal.   Nursing note and vitals reviewed. Diagnostic Study Results     Labs -  Recent Results (from the past 12 hour(s))   LACTIC ACID    Collection Time: 03/15/18  8:15 PM   Result Value Ref Range    Lactic acid 1.9 0.4 - 2.0 MMOL/L   CBC WITH AUTOMATED DIFF    Collection Time: 03/15/18  8:24 PM   Result Value Ref Range    WBC 8.4 4.1 - 11.1 K/uL    RBC 5.62 4.10 - 5.70 M/uL    HGB 15.8 12.1 - 17.0 g/dL    HCT 45.9 36.6 - 50.3 %    MCV 81.7 80.0 - 99.0 FL    MCH 28.1 26.0 - 34.0 PG    MCHC 34.4 30.0 - 36.5 g/dL    RDW 12.0 11.5 - 14.5 %    PLATELET 378 056 - 106 K/uL    MPV 10.5 8.9 - 12.9 FL    NRBC 0.0 0  WBC    ABSOLUTE NRBC 0.00 0.00 - 0.01 K/uL    NEUTROPHILS 88 (H) 32 - 75 %    LYMPHOCYTES 7 (L) 12 - 49 %    MONOCYTES 4 (L) 5 - 13 %    EOSINOPHILS 0 0 - 7 %    BASOPHILS 0 0 - 1 %    IMMATURE GRANULOCYTES 1 (H) 0.0 - 0.5 %    ABS. NEUTROPHILS 7.4 1.8 - 8.0 K/UL    ABS. LYMPHOCYTES 0.6 (L) 0.8 - 3.5 K/UL    ABS. MONOCYTES 0.3 0.0 - 1.0 K/UL    ABS. EOSINOPHILS 0.0 0.0 - 0.4 K/UL    ABS. BASOPHILS 0.0 0.0 - 0.1 K/UL    ABS. IMM.  GRANS. 0.1 (H) 0.00 - 0.04 K/UL    DF SMEAR SCANNED      RBC COMMENTS NORMOCYTIC, NORMOCHROMIC     METABOLIC PANEL, COMPREHENSIVE    Collection Time: 03/15/18  8:24 PM   Result Value Ref Range    Sodium 135 (L) 136 - 145 mmol/L    Potassium 3.3 (L) 3.5 - 5.1 mmol/L    Chloride 99 97 - 108 mmol/L    CO2 29 21 - 32 mmol/L Anion gap 7 5 - 15 mmol/L    Glucose 113 (H) 65 - 100 mg/dL    BUN 10 6 - 20 MG/DL    Creatinine 1.25 0.70 - 1.30 MG/DL    BUN/Creatinine ratio 8 (L) 12 - 20      GFR est AA >60 >60 ml/min/1.73m2    GFR est non-AA >60 >60 ml/min/1.73m2    Calcium 9.2 8.5 - 10.1 MG/DL    Bilirubin, total 0.5 0.2 - 1.0 MG/DL    ALT (SGPT) 27 12 - 78 U/L    AST (SGOT) 22 15 - 37 U/L    Alk. phosphatase 63 45 - 117 U/L    Protein, total 8.1 6.4 - 8.2 g/dL    Albumin 3.9 3.5 - 5.0 g/dL    Globulin 4.2 (H) 2.0 - 4.0 g/dL    A-G Ratio 0.9 (L) 1.1 - 2.2     LIPASE    Collection Time: 03/15/18  8:24 PM   Result Value Ref Range    Lipase 345 73 - 393 U/L   URINALYSIS W/ RFLX MICROSCOPIC    Collection Time: 03/15/18 10:46 PM   Result Value Ref Range    Color YELLOW/STRAW      Appearance CLEAR CLEAR      Specific gravity 1.019 1.003 - 1.030      pH (UA) 6.5 5.0 - 8.0      Protein TRACE (A) NEG mg/dL    Glucose NEGATIVE  NEG mg/dL    Ketone NEGATIVE  NEG mg/dL    Bilirubin NEGATIVE  NEG      Blood NEGATIVE  NEG      Urobilinogen 0.2 0.2 - 1.0 EU/dL    Nitrites NEGATIVE  NEG      Leukocyte Esterase NEGATIVE  NEG      WBC 0-4 0 - 4 /hpf    RBC 0-5 0 - 5 /hpf    Epithelial cells FEW FEW /lpf    Bacteria NEGATIVE  NEG /hpf    Hyaline cast 0-2 0 - 5 /lpf   POTASSIUM    Collection Time: 03/15/18 10:46 PM   Result Value Ref Range    Potassium 3.0 (L) 3.5 - 5.1 mmol/L   MAGNESIUM    Collection Time: 03/15/18 10:46 PM   Result Value Ref Range    Magnesium 1.5 (L) 1.6 - 2.4 mg/dL       Radiologic Studies -   CT Results  (Last 48 hours)               03/15/18 2240  CT ABD PELV W CONT Final result    Impression:  IMPRESSION:       1. No acute process on CT. Normal appendix. 2. 2 cm indeterminate right adrenal nodule. Recommend nonemergent adrenal CT or   MRI. Narrative:  EXAM:  CT ABD PELV W CONT       INDICATION: Right lower quadrant abdominal pain for one day. Normal white blood   cell count. Normal liver enzymes and serum lipase.  No previous abdominal   surgery. COMPARISON: None. CONTRAST:  100 mL of Isovue-370. TECHNIQUE:    Following the uneventful intravenous administration of contrast, thin axial   images were obtained through the abdomen and pelvis. Coronal and sagittal   reconstructions were generated. Oral contrast was administered. CT dose   reduction was achieved through use of a standardized protocol tailored for this   examination and automatic exposure control for dose modulation. FINDINGS:    LUNG BASES: Clear. INCIDENTALLY IMAGED HEART AND MEDIASTINUM: Unremarkable. LIVER: Subcentimeter low-density lesions in the liver most likely represent   cysts. Normal size. Smooth surface. GALLBLADDER: Unremarkable. SPLEEN: No mass. PANCREAS: No mass or ductal dilatation. ADRENALS: Right adrenal nodule measures 2 cm in diameter and 33 Hounsfield   units. KIDNEYS: Right renal simple cyst is in the upper pole and measures 1.4 cm. No   solid renal mass or hydronephrosis. STOMACH: Unremarkable. SMALL BOWEL: No dilatation or wall thickening. COLON: Minimal diverticulosis. No diverticulitis. APPENDIX: Unremarkable. PERITONEUM: No ascites or pneumoperitoneum. RETROPERITONEUM: No lymphadenopathy or aortic aneurysm. REPRODUCTIVE ORGANS: Prostate gland is within normal limits. URINARY BLADDER: No mass or calculus. BONES: No destructive bone lesion. ADDITIONAL COMMENTS: N/A                 Medical Decision Making   I am the first provider for this patient. I reviewed the vital signs, available nursing notes, past medical history, past surgical history, family history and social history. Vital Signs-Reviewed the patient's vital signs.   Patient Vitals for the past 12 hrs:   Temp Pulse Resp BP SpO2   03/15/18 2337 - 92 - (!) 176/108 -   03/15/18 2139 - 95 - (!) 189/108 -   03/15/18 2055 - 83 - (!) 232/132 -   03/15/18 1941 98.9 °F (37.2 °C) 97 20 (!) 203/129 98 %     Pulse Oximetry Analysis - 98% on RA    Cardiac Monitor:   Rate: 95 bpm  Rhythm: Normal Sinus Rhythm     Records Reviewed: Nursing Notes and Old Medical Records    Provider Notes (Medical Decision Making):   DDx: Accelerated HTN, appendicitis, biliary colic, pancreatitis, diverticulitis, gastroenteritis, dehydration, electrolyte abnormality, UTI, kidney stone    ED Course:   Initial assessment performed. The patients presenting problems have been discussed, and they are in agreement with the care plan formulated and outlined with them. I have encouraged them to ask questions as they arise throughout their visit. 8:18 PM  Tobacco Counseling  Discussed the risks of smoking and the benefits of smoking cessation as well as the long term sequelae of smoking with the patient. The patient verbalized their understanding. SIGN OUT:  9:09 PM  Patient's presentation, labs/imaging and plan of care was reviewed with Herman Yung MD as part of sign out. They will assume care as part of the plan discussed with the patient. Herman Yung MD's assistance in completion of this plan is greatly appreciated but it should be noted that I will be the provider of record for this patient. Shauna Schulz MD    PROGRESS NOTE:  9:19 PM  Pt has been re-evaluated. His blood pressure is 209/109. He states his pain is down to a 2-3/10. Progress Note:  11:33 PM  Pt and family updated on all available results. Pt expresses his understanding and agrees with the current plan of care, states that he is ready for discharge. He notes that he has not been compliant with his prescribed blood pressure medications as he is not currently insured. At his request, I will provide him with [place] upon discharge.    Written by DEANDRA Aguero, as dictated by Herman Yung MD.    Critical Care Time:   CRITICAL CARE NOTE :  8:38 PM  IMPENDING DETERIORATION -Cardiovascular and Metabolic  ASSOCIATED RISK FACTORS - Dysrhythmia, Metabolic changes and Dehydration  MANAGEMENT- Bedside Assessment and Supervision of Care  INTERPRETATION -  Xrays, CT Scan, ECG and Blood Pressure  INTERVENTIONS - hemodynamic mngmt and Metobolic interventions  CASE REVIEW - Nursing and Family  TREATMENT RESPONSE -Improved  PERFORMED BY - Self and Physician    NOTES   :  I have spent 60 minutes of critical care time involved in lab review, consultations with specialist, family decision- making, bedside attention and documentation. During this entire length of time I was immediately available to the patient . Sukhi Stinson MD    Disposition:  DISCHARGE NOTE  12:02 AM  The patient has been re-evaluated and is ready for discharge. Reviewed available results with patient. Counseled patient on diagnosis and care plan. Patient has expressed understanding, and all questions have been answered. Patient agrees with plan and agrees to follow up as recommended, or return to the ED if their symptoms worsen. Discharge instructions have been provided and explained to the patient, along with reasons to return to the ED. PLAN:  1. Discharge  Current Discharge Medication List      START taking these medications    Details   HYDROcodone-acetaminophen (NORCO) 5-325 mg per tablet Take 1 Tab by mouth every four (4) hours as needed for Pain. Max Daily Amount: 6 Tabs. Qty: 20 Tab, Refills: 0    Associated Diagnoses: Acute abdominal pain      potassium chloride SR (KLOR-CON 10) 10 mEq tablet Take 1 Tab by mouth two (2) times a day. Qty: 10 Tab, Refills: 0         CONTINUE these medications which have CHANGED    Details   hydroCHLOROthiazide (MICROZIDE) 12.5 mg capsule Take 1 Cap by mouth daily. Qty: 30 Cap, Refills: 0      amLODIPine (NORVASC) 10 mg tablet Take 1 Tab by mouth daily. Qty: 30 Tab, Refills: 3      atorvastatin (LIPITOR) 40 mg tablet Take 1 Tab by mouth daily.   Qty: 30 Tab, Refills: 0    Associated Diagnoses: High triglycerides      metoprolol tartrate (LOPRESSOR) 100 mg IR tablet Take 100 in the am and 50 in the pm  Qty: 60 Tab, Refills: 0      lisinopril (PRINIVIL, ZESTRIL) 40 mg tablet Take 1 Tab by mouth daily. Qty: 30 Tab, Refills: 0           2. Follow-up Information     Follow up With Details Comments MD Kimberli In 1 day  [address]  [phone]      Nicholas Sweeney MD  As needed [address]  [address] [place] [address]  [phone]      [facility] EMERGENCY DEPT  If symptoms worsen [address] [place] [address]  [phone]        Return to ED if worse     Diagnosis     Clinical Impression:   1. Acute abdominal pain    2. Accelerated hypertension    3. Nausea and vomiting, intractability of vomiting not specified, unspecified vomiting type    4. Hypokalemia    5. Hypomagnesemia    6. High triglycerides    7. Adrenal nodule ([facility] [address]        Attestations: This note is prepared by Alejandra Martinez, acting as Scribe for Linden Workman MD.    Linden Workman MD: The scribe's documentation has been prepared under my direction and personally reviewed by me in its entirety. I confirm that the note above accurately reflects all work, treatment, procedures, and medical decision making performed by me.

## 2018-03-16 PROCEDURE — 74011250637 HC RX REV CODE- 250/637: Performed by: EMERGENCY MEDICINE

## 2018-03-16 RX ADMIN — POTASSIUM CHLORIDE 40 MEQ: 1.5 POWDER, FOR SOLUTION ORAL at 00:51

## 2018-03-16 NOTE — ED NOTES
Pt resting in stretcher. Call bell within reach. Updated on plan of care. Initiated fluids and medicated for pressure. No other complaints voiced at this time. Continues to consume gastroview. Denies any nausea at this time. Awaiting CT scan.

## 2018-03-16 NOTE — ED NOTES
Discharge instructions reviewed with pt and given by Dr. Josue Pratt. Mg completed. Medicated with potassium. IV discontinued with catheter intact. Taken off of monitor. Pt ambulated out of ED with steady gait after declining wheelchair ride out. No other complaints voiced at this time. Adult female visitor to assist pt in getting home.

## 2018-03-16 NOTE — DISCHARGE INSTRUCTIONS
Abdominal Pain: Care Instructions  Your Care Instructions  Abdominal pain has many possible causes. Some aren't serious and get better on their own in a few days. Others need more testing and treatment. If your pain continues or gets worse, you need to be rechecked and may need more tests to find out what is wrong. You may need surgery to correct the problem. Don't ignore new symptoms, such as fever, nausea and vomiting, urination problems, pain that gets worse, and dizziness. These may be signs of a more serious problem. Your doctor may have recommended a follow-up visit in the next 8 to 12 hours. If you are not getting better, you may need more tests or treatment. The doctor has checked you carefully, but problems can develop later. If you notice any problems or new symptoms, get medical treatment right away. Follow-up care is a key part of your treatment and safety. Be sure to make and go to all appointments, and call your doctor if you are having problems. It's also a good idea to know your test results and keep a list of the medicines you take. How can you care for yourself at home? · Rest until you feel better. · To prevent dehydration, drink plenty of fluids, enough so that your urine is light yellow or clear like water. Choose water and other caffeine-free clear liquids until you feel better. If you have kidney, heart, or liver disease and have to limit fluids, talk with your doctor before you increase the amount of fluids you drink. · If your stomach is upset, eat mild foods, such as rice, dry toast or crackers, bananas, and applesauce. Try eating several small meals instead of two or three large ones. · Wait until 48 hours after all symptoms have gone away before you have spicy foods, alcohol, and drinks that contain caffeine. · Do not eat foods that are high in fat. · Avoid anti-inflammatory medicines such as aspirin, ibuprofen (Advil, Motrin), and naproxen (Aleve).  These can cause stomach upset. Talk to your doctor if you take daily aspirin for another health problem. When should you call for help? Call 911 anytime you think you may need emergency care. For example, call if:  ? · You passed out (lost consciousness). ? · You pass maroon or very bloody stools. ? · You vomit blood or what looks like coffee grounds. ? · You have new, severe belly pain. ?Call your doctor now or seek immediate medical care if:  ? · Your pain gets worse, especially if it becomes focused in one area of your belly. ? · You have a new or higher fever. ? · Your stools are black and look like tar, or they have streaks of blood. ? · You have unexpected vaginal bleeding. ? · You have symptoms of a urinary tract infection. These may include:  ¨ Pain when you urinate. ¨ Urinating more often than usual.  ¨ Blood in your urine. ? · You are dizzy or lightheaded, or you feel like you may faint. ? Watch closely for changes in your health, and be sure to contact your doctor if:  ? · You are not getting better after 1 day (24 hours). Where can you learn more? Go to http://nakul-chel.info/. Enter D063 in the search box to learn more about \"Abdominal Pain: Care Instructions. \"  Current as of: March 20, 2017  Content Version: 11.4  © 6516-4268 [employer]. Care instructions adapted under license by [employer] (which disclaims liability or warranty for this information). If you have questions about a medical condition or this instruction, always ask your healthcare professional. [place] [address] any warranty or liability for your use of this information. High Blood Pressure: Care Instructions  Your Care Instructions  If your blood pressure is usually above 140/90, you have high blood pressure, or hypertension. That means the top number is 140 or higher or the bottom number is 90 or higher, or both.   Despite what a lot of people think, high blood pressure usually doesn't cause headaches or make you feel dizzy or lightheaded. It usually has no symptoms. But it does increase your risk for heart attack, stroke, and kidney or eye damage. The higher your blood pressure, the more your risk increases. Your doctor will give you a goal for your blood pressure. Your goal will be based on your health and your age. An example of a goal is to keep your blood pressure below 140/90. Lifestyle changes, such as eating healthy and being active, are always important to help lower blood pressure. You might also take medicine to reach your blood pressure goal.  Follow-up care is a key part of your treatment and safety. Be sure to make and go to all appointments, and call your doctor if you are having problems. It's also a good idea to know your test results and keep a list of the medicines you take. How can you care for yourself at home? Medical treatment  · If you stop taking your medicine, your blood pressure will go back up. You may take one or more types of medicine to lower your blood pressure. Be safe with medicines. Take your medicine exactly as prescribed. Call your doctor if you think you are having a problem with your medicine. · Talk to your doctor before you start taking aspirin every day. Aspirin can help certain people lower their risk of a heart attack or stroke. But taking aspirin isn't right for everyone, because it can cause serious bleeding. · See your doctor regularly. You may need to see the doctor more often at first or until your blood pressure comes down. · If you are taking blood pressure medicine, talk to your doctor before you take decongestants or anti-inflammatory medicine, such as ibuprofen. Some of these medicines can raise blood pressure. · Learn how to check your blood pressure at home. Lifestyle changes  · Stay at a healthy weight.  This is especially important if you put on weight around the waist. Losing even 10 pounds can help you lower your blood pressure. · If your doctor recommends it, get more exercise. Walking is a good choice. Bit by bit, increase the amount you walk every day. Try for at least 30 minutes on most days of the week. You also may want to swim, bike, or do other activities. · Avoid or limit alcohol. Talk to your doctor about whether you can drink any alcohol. · Try to limit how much sodium you eat to less than 2,300 milligrams (mg) a day. Your doctor may ask you to try to eat less than 1,500 mg a day. · Eat plenty of fruits (such as bananas and oranges), vegetables, legumes, whole grains, and low-fat dairy products. · Lower the amount of saturated fat in your diet. Saturated fat is found in animal products such as milk, cheese, and meat. Limiting these foods may help you lose weight and also lower your risk for heart disease. · Do not smoke. Smoking increases your risk for heart attack and stroke. If you need help quitting, talk to your doctor about stop-smoking programs and medicines. These can increase your chances of quitting for good. When should you call for help? Call 911 anytime you think you may need emergency care. This may mean having symptoms that suggest that your blood pressure is causing a serious heart or blood vessel problem. Your blood pressure may be over 180/110. ? For example, call 911 if:  ? · You have symptoms of a heart attack. These may include:  ¨ Chest pain or pressure, or a strange feeling in the chest.  ¨ Sweating. ¨ Shortness of breath. ¨ Nausea or vomiting. ¨ Pain, pressure, or a strange feeling in the back, neck, jaw, or upper belly or in one or both shoulders or arms. ¨ Lightheadedness or sudden weakness. ¨ A fast or irregular heartbeat. ? · You have symptoms of a stroke. These may include:  ¨ Sudden numbness, tingling, weakness, or loss of movement in your face, arm, or leg, especially on only one side of your body. ¨ Sudden vision changes.   ¨ Sudden trouble speaking. ¨ Sudden confusion or trouble understanding simple statements. ¨ Sudden problems with walking or balance. ¨ A sudden, severe headache that is different from past headaches. ? · You have severe back or belly pain. ?Do not wait until your blood pressure comes down on its own. Get help right away. ?Call your doctor now or seek immediate care if:  ? · Your blood pressure is much higher than normal (such as 180/110 or higher), but you don't have symptoms. ? · You think high blood pressure is causing symptoms, such as:  ¨ Severe headache. ¨ Blurry vision. ? Watch closely for changes in your health, and be sure to contact your doctor if:  ? · Your blood pressure measures 140/90 or higher at least 2 times. That means the top number is 140 or higher or the bottom number is 90 or higher, or both. ? · You think you may be having side effects from your blood pressure medicine. ? · Your blood pressure is usually normal, but it goes above normal at least 2 times. Where can you learn more? Go to http://nakul-chel.info/. Enter Y479 in the search box to learn more about \"High Blood Pressure: Care Instructions. \"  Current as of: September 21, 2016  Content Version: 11.4  © 9383-5096 [employer]. Care instructions adapted under license by [employer] (which disclaims liability or warranty for this information). If you have questions about a medical condition or this instruction, always ask your healthcare professional. [place] [address] any warranty or liability for your use of this information. Nausea and Vomiting: Care Instructions  Your Care Instructions  When you are nauseated, you may feel weak and sweaty and notice a lot of saliva in your mouth. Nausea often leads to vomiting. Most of the time you do not need to worry about nausea and vomiting, but they can be signs of other illnesses.   Two common causes of nausea and vomiting are stomach flu and food poisoning. Nausea and vomiting from viral stomach flu will usually start to improve within 24 hours. Nausea and vomiting from food poisoning may last from 12 to 48 hours. The doctor has checked you carefully, but problems can develop later. If you notice any problems or new symptoms, get medical treatment right away. Follow-up care is a key part of your treatment and safety. Be sure to make and go to all appointments, and call your doctor if you are having problems. It's also a good idea to know your test results and keep a list of the medicines you take. How can you care for yourself at home? · To prevent dehydration, drink plenty of fluids, enough so that your urine is light yellow or clear like water. Choose water and other caffeine-free clear liquids until you feel better. If you have kidney, heart, or liver disease and have to limit fluids, talk with your doctor before you increase the amount of fluids you drink. · Rest in bed until you feel better. · When you are able to eat, try clear soups, mild foods, and liquids until all symptoms are gone for 12 to 48 hours. Other good choices include dry toast, crackers, cooked cereal, and gelatin dessert, such as Jell-O. When should you call for help? Call 911 anytime you think you may need emergency care. For example, call if:  ? · You passed out (lost consciousness). ?Call your doctor now or seek immediate medical care if:  ? · You have symptoms of dehydration, such as:  ¨ Dry eyes and a dry mouth. ¨ Passing only a little dark urine. ¨ Feeling thirstier than usual.   ? · You have new or worsening belly pain. ? · You have a new or higher fever. ? · You vomit blood or what looks like coffee grounds. ? Watch closely for changes in your health, and be sure to contact your doctor if:  ? · You have ongoing nausea and vomiting. ? · Your vomiting is getting worse. ? · Your vomiting lasts longer than 2 days.    ? · You are not getting better as expected. Where can you learn more? Go to http://nakul-chel.info/. Enter 25 134596 in the search box to learn more about \"Nausea and Vomiting: Care Instructions. \"  Current as of: March 20, 2017  Content Version: 11.4  © 7506-1779 [employer]. Care instructions adapted under license by "[employer] (which disclaims liability or warranty for this information). If you have questions about a medical condition or this instruction, always ask your healthcare professional. [address] any warranty or liability for your use of this information. Hypokalemia: Care Instructions  Your Care Instructions    Hypokalemia (say \"mu-rk-thw-MARKUS-grayson-uh\") is a low level of potassium. The heart, muscles, kidneys, and nervous system all need potassium to work well. This problem has many different causes. Kidney problems, diet, and medicines like diuretics and laxatives can cause it. So can vomiting or diarrhea. In some cases, cancer is the cause. Your doctor may do tests to find the cause of your low potassium levels. You may need medicines to bring your potassium levels back to normal. You may also need regular blood tests to check your potassium. If you have very low potassium, you may need intravenous (IV) medicines. You also may need tests to check the electrical activity of your heart. Heart problems caused by low potassium levels can be very serious. Follow-up care is a key part of your treatment and safety. Be sure to make and go to all appointments, and call your doctor if you are having problems. It's also a good idea to know your test results and keep a list of the medicines you take. How can you care for yourself at home? · If your doctor recommends it, eat foods that have a lot of potassium. These include fresh fruits, juices, and vegetables. They also include nuts, beans, and milk. · Be safe with medicines.  If your doctor prescribes medicines or potassium supplements, take them exactly as directed. Call your doctor if you have any problems with your medicines. · Get your potassium levels tested as often as your doctor tells you. When should you call for help? Call 911 anytime you think you may need emergency care. For example, call if:  ? · You feel like your heart is missing beats. Heart problems caused by low potassium can cause death. ? · You passed out (lost consciousness). ? · You have a seizure. ?Call your doctor now or seek immediate medical care if:  ? · You feel weak or unusually tired. ? · You have severe arm or leg cramps. ? · You have tingling or numbness. ? · You feel sick to your stomach, or you vomit. ? · You have belly cramps. ? · You feel bloated or constipated. ? · You have to urinate a lot. ? · You feel very thirsty most of the time. ? · You are dizzy or lightheaded, or you feel like you may faint. ? · You feel depressed, or you lose touch with reality. ? Watch closely for changes in your health, and be sure to contact your doctor if:  ? · You do not get better as expected. Where can you learn more? Go to http://nakul-chel.info/. Enter G358 in the search box to learn more about \"Hypokalemia: Care Instructions. \"  Current as of: May 12, 2017  Content Version: 11.4  © 3696-0633 [employer]. Care instructions adapted under license by [employer] (which disclaims liability or warranty for this information). If you have questions about a medical condition or this instruction, always ask your healthcare professional. [place] [address] any warranty or liability for your use of this information. Thank you! Thank you for allowing us to provide you with excellent care today. We hope we addressed all of your concerns and needs. We strive to provide excellent quality care in the Emergency Department.  You will receive a survey after your visit to evaluate the care you were provided. Please rate us a level 5 (excellent), as anything less than excellent does not meet our goals. If you feel that you have not received excellent quality care or timely care, please ask to speak to the nurse manager. Please choose us in the future for your continued health care needs. ------------------------------------------------------------------------------------------------------------  The exam and treatment you received in the Emergency Department were for an urgent problem and are not intended as complete care. It is important that you follow up with a doctor, nurse practitioner, or physician assistant for ongoing care. If your symptoms become worse or you do not improve as expected and you are unable to reach your usual health care provider, you should return to the Emergency Department. We are available 24 hours a day. Please take your discharge instructions with you when you go to your follow-up appointment. If you have any problem arranging a follow-up appointment, contact the Emergency Department immediately. If a prescription has been provided, please have it filled as soon as possible to prevent a delay in treatment. Read the entire medication instruction sheet provided to you by the pharmacy. If you have any questions or reservations about taking the medication due to side effects or interactions with other medications, please call your primary care physician or contact the ER to speak with the charge nurse. Make an appointment with your family doctor or the physician you were referred to for follow-up of this visit as instructed on your discharge paperwork, as this is mandatory follow-up. Return to the ER if you are unable to be seen or if you are unable to be seen in a timely manner. If you have any problem arranging the follow-up visit, contact the Emergency Department immediately.

## 2018-03-16 NOTE — ED NOTES
Pt resting in stretcher. Call bell within reach. Updated on plan of care. Medicate for pain, nausea, and pressure. Provided with gastroview and instructions on how to consume. Pt voiced understandings. No other complaints voiced at this time.

## 2018-11-12 ENCOUNTER — HOSPITAL ENCOUNTER (EMERGENCY)
Age: 33
Discharge: HOME | End: 2018-11-12
Attending: EMERGENCY MEDICINE
Payer: SELF-PAY

## 2018-11-12 ENCOUNTER — APPOINTMENT (OUTPATIENT)
Dept: CT IMAGING | Age: 33
End: 2018-11-12
Attending: EMERGENCY MEDICINE
Payer: SELF-PAY

## 2018-11-12 VITALS
RESPIRATION RATE: 20 BRPM | SYSTOLIC BLOOD PRESSURE: 155 MMHG | TEMPERATURE: 98.3 F | WEIGHT: 233.69 LBS | BODY MASS INDEX: 33.46 KG/M2 | OXYGEN SATURATION: 98 % | HEART RATE: 76 BPM | DIASTOLIC BLOOD PRESSURE: 106 MMHG | HEIGHT: 70 IN

## 2018-11-12 DIAGNOSIS — M54.12 CERVICAL RADICULOPATHY: Primary | ICD-10-CM

## 2018-11-12 DIAGNOSIS — E78.1 HIGH TRIGLYCERIDES: ICD-10-CM

## 2018-11-12 PROCEDURE — 99282 EMERGENCY DEPT VISIT SF MDM: CPT

## 2018-11-12 PROCEDURE — 72125 CT NECK SPINE W/O DYE: CPT

## 2018-11-12 RX ORDER — AMLODIPINE BESYLATE 5 MG/1
10 TABLET ORAL DAILY
Qty: 30 TAB | Refills: 5 | Status: SHIPPED | OUTPATIENT
Start: 2018-11-12

## 2018-11-12 RX ORDER — HYDROCHLOROTHIAZIDE 12.5 MG/1
12.5 CAPSULE ORAL DAILY
Qty: 30 CAP | Refills: 5 | Status: SHIPPED | OUTPATIENT
Start: 2018-11-12

## 2018-11-12 RX ORDER — OXYCODONE HYDROCHLORIDE 5 MG/1
5 TABLET ORAL
Qty: 10 TAB | Refills: 0 | Status: SHIPPED | OUTPATIENT
Start: 2018-11-12 | End: 2019-03-03

## 2018-11-12 RX ORDER — METOPROLOL TARTRATE 100 MG/1
TABLET ORAL
Qty: 60 TAB | Refills: 5 | Status: SHIPPED | OUTPATIENT
Start: 2018-11-12 | End: 2018-11-12

## 2018-11-12 RX ORDER — LISINOPRIL 40 MG/1
40 TABLET ORAL DAILY
Qty: 30 TAB | Refills: 5 | Status: SHIPPED | OUTPATIENT
Start: 2018-11-12 | End: 2018-11-12

## 2018-11-12 RX ORDER — ATORVASTATIN CALCIUM 40 MG/1
40 TABLET, FILM COATED ORAL DAILY
Qty: 30 TAB | Refills: 5 | Status: SHIPPED | OUTPATIENT
Start: 2018-11-12

## 2018-11-12 RX ORDER — PREDNISONE 20 MG/1
40 TABLET ORAL DAILY
Qty: 10 TAB | Refills: 0 | Status: SHIPPED | OUTPATIENT
Start: 2018-11-12 | End: 2018-11-17

## 2018-11-12 RX ORDER — METOPROLOL TARTRATE 100 MG/1
TABLET ORAL
Qty: 60 TAB | Refills: 5 | Status: SHIPPED | OUTPATIENT
Start: 2018-11-12

## 2018-11-12 RX ORDER — NALOXONE HYDROCHLORIDE 4 MG/.1ML
SPRAY NASAL
Qty: 2 EACH | Refills: 0 | Status: SHIPPED | OUTPATIENT
Start: 2018-11-12

## 2018-11-12 RX ORDER — DIAZEPAM 5 MG/1
5 TABLET ORAL
Qty: 15 TAB | Refills: 0 | Status: SHIPPED | OUTPATIENT
Start: 2018-11-12 | End: 2019-03-03

## 2018-11-12 RX ORDER — LISINOPRIL 40 MG/1
40 TABLET ORAL DAILY
Qty: 30 TAB | Refills: 5 | Status: SHIPPED | OUTPATIENT
Start: 2018-11-12

## 2018-11-12 NOTE — LETTER
[facility] EMERGENCY DEPT 
[address] [place] [address] [phone] [id number] [phone] Work/School Note Date: 11/12/2018 To Whom It May concern: 
 
Lucrecia Rodriguez. was seen and treated today in the emergency room by the following provider(s): 
Attending Provider: DO. Lucrecia Baca. off work on 11/13/2018.  
 
 
Sincerely, 
 
 
 
 
Michoacano Bird DO

## 2018-11-13 NOTE — ED NOTES
Dr. Berta Wu reviewed discharge instructions with the patient. The patient verbalized understanding. All questions and concerns were addressed. The patient declined a wheelchair and is discharged ambulatory in the care of family members with instructions and prescriptions in hand. Pt is alert and oriented x 4. Respirations are clear and unlabored.

## 2018-11-14 NOTE — ED PROVIDER NOTES
EMERGENCY DEPARTMENT HISTORY AND PHYSICAL EXAM 
 
 
Date: 11/12/2018 Patient Name: Mela Juarez. History of Presenting Illness Chief Complaint Patient presents with  Back Pain Upper back pain that started 2 weeks ago while stretching. No relief from Naproxen at home. Intermittent paresthesias and pain with ROM + left lateral neck rotation. No step-offs or deformities. Seeing a chiropractor for pain. History Provided By: Patient HPI: Mela Juarez., 26 y.o. male with PMHx significant for HTN, high cholesterol, obesity, presents by POV to the ED with cc of upper back low neck pain with radicular symptoms down left arm. Pt states 2 weeks ago he was stretching and felt a pull/pop and has been having some numbness and tingling in left arm don to his finger tips. Pt states he had been seen by chiropractor w/o sig improvement. Pt here in the ED does not have any pain, Prior pain mod in severity worsened by movement. No chest pain, SOA, n/v, no incontinence of bowel or bladder, denies h/a or sensory loss to the left hand. Pt states he has been on BP meds as well statin but ran out of all f his neds a few months ago. Pt does not have a PCP and currently in the process of obtaining health insurance. Pt had been on multiple BP meds with strong family hx. He has had some weight loss associated with increase in exercise and better food choices. There are no other complaints, changes, or physical findings at this time. PCP: None Current Outpatient Medications Medication Sig Dispense Refill  hydroCHLOROthiazide (MICROZIDE) 12.5 mg capsule Take 1 Cap by mouth daily. 30 Cap 5  
 amLODIPine (NORVASC) 5 mg tablet Take 2 Tabs by mouth daily. 30 Tab 5  
 atorvastatin (LIPITOR) 40 mg tablet Take 1 Tab by mouth daily. 30 Tab 5  
 diazePAM (VALIUM) 5 mg tablet Take 1 Tab by mouth every eight (8) hours as needed (spasm).  Max Daily Amount: 15 mg. 15 Tab 0  
  oxyCODONE IR (ROXICODONE) 5 mg immediate release tablet Take 1 Tab by mouth every four (4) hours as needed for Pain. Max Daily Amount: 30 mg. 10 Tab 0  predniSONE (DELTASONE) 20 mg tablet Take 40 mg by mouth daily for 5 days. With Breakfast 10 Tab 0  
 naloxone (NARCAN) 4 mg/actuation nasal spray Use 1 spray intranasally, then discard. Repeat with new spray every 2 min as needed for opioid overdose symptoms, alternating nostrils. 2 Each 0  
 metoprolol tartrate (LOPRESSOR) 100 mg IR tablet Take 100 in the am and 50 in the pm 60 Tab 5  
 lisinopril (PRINIVIL, ZESTRIL) 40 mg tablet Take 1 Tab by mouth daily. 30 Tab 5 Past History Past Medical History: 
Past Medical History:  
Diagnosis Date  Hypertension Past Surgical History: 
History reviewed. No pertinent surgical history. Family History: 
Family History Problem Relation Age of Onset  Hypertension Mother  Hypertension Father Social History: 
Social History Tobacco Use  Smoking status: Former Smoker Packs/day: 0.50 Years: 4.00 Pack years: 2.00 Last attempt to quit: 2014 Years since quittin.5  Smokeless tobacco: Never Used Substance Use Topics  Alcohol use: Yes Comment: rarely  Drug use: Yes Frequency: 14.0 times per week Types: Marijuana Comment: last uesed  Allergies: 
No Known Allergies Review of Systems Review of Systems Constitutional: Negative. Negative for appetite change, chills, fatigue and fever. HENT: Negative. Negative for congestion, rhinorrhea, sinus pressure and sore throat. Eyes: Negative. Respiratory: Negative. Negative for cough, choking, chest tightness, shortness of breath and wheezing. Cardiovascular: Negative. Negative for chest pain, palpitations and leg swelling. Gastrointestinal: Negative for abdominal pain, constipation, diarrhea, nausea and vomiting. Endocrine: Negative. Genitourinary: Negative. Negative for difficulty urinating, dysuria, flank pain and urgency. Musculoskeletal: Positive for back pain (Left upper back c/w left upper trapezius), neck pain and neck stiffness. Skin: Negative. Neurological: Positive for numbness (tingling left hand and fingers). Negative for dizziness, speech difficulty, weakness, light-headedness and headaches. Neg spurling's Psychiatric/Behavioral: Negative. All other systems reviewed and are negative. Physical Exam  
Physical Exam 
 
Diagnostic Study Results Labs - No results found for this or any previous visit (from the past 12 hour(s)). Radiologic Studies -  
CT SPINE CERV WO CONT Final Result CT Results  (Last 48 hours)  
          
 11/12/18 1947  CT SPINE CERV WO CONT Final result Impression:  IMPRESSION:  
Mild degenerative changes and bulging discs. No spinal stenosis. Narrative:  EXAM:  CT CERVICAL SPINE WITHOUT CONTRAST INDICATION:   Neck pain, cervical spine.  , Onset 2 weeks ago while stretching,  
intermittent paresthesias and pain with movement COMPARISON: None. CONTRAST:  None. TECHNIQUE: Multislice helical CT of the cervical spine was performed without  
intravenous contrast administration. Sagittal and coronal reconstructions were  
generated. CT dose reduction was achieved through use of a standardized  
protocol tailored for this examination and automatic exposure control for dose  
modulation. FINDINGS:  
   
The alignment is within normal limits. There is no fracture or subluxation. The  
odontoid process is intact. The craniocervical junction is within normal limits. The prevertebral soft tissues are within normal limits. C2-C3:  There is no spinal canal or neural foraminal stenosis. C3-C4:  There is no spinal canal or neural foraminal stenosis. There is minimal  
bulging of the disc. C4-C5:  There is no spinal canal or neural foraminal stenosis. There is mild  
right uncovertebral joint hypertrophy with minimal right foraminal narrowing. C5-C6:  There is no spinal canal or neural foraminal stenosis. There is mild  
bulging of the disc. C6-C7:  There is no spinal canal or neural foraminal stenosis. C7-T1:  There is no spinal canal or neural foraminal stenosis. CXR Results  (Last 48 hours) None Medical Decision Making I am the first provider for this patient. I reviewed the vital signs, available nursing notes, past medical history, past surgical history, family history and social history. Vital Signs-Reviewed the patient's vital signs. No data found. Pulse Oximetry Analysis - 98% on RA Records Reviewed: Nursing Notes and Old Medical Records Provider Notes (Medical Decision Making):  
Cervical radiculopathy, DDD, HTN, brachial plexus injury ED Course:  
Initial assessment performed. The patients presenting problems have been discussed, and they are in agreement with the care plan formulated and outlined with them. I have encouraged them to ask questions as they arise throughout their visit. Critical Care Time:  
None Disposition: 
DC home f/u with chiropractor given disc and copy results. Rx's for all of his BP meds, discussed starting 2 of them and monitor BP with diary, we also discussed how to go about adding back in BP meds in a stepwise approach. Pt given list of area free clinics including BP clinic. PLAN: 
1. Discharge Medication List as of 11/12/2018  9:21 PM  
  
START taking these medications Details  
diazePAM (VALIUM) 5 mg tablet Take 1 Tab by mouth every eight (8) hours as needed (spasm). Max Daily Amount: 15 mg., Print, Disp-15 Tab, R-0  
  
oxyCODONE IR (ROXICODONE) 5 mg immediate release tablet Take 1 Tab by mouth every four (4) hours as needed for Pain.  Max Daily Amount: 30 mg., Print, Disp-10 Tab, R-0  
  
predniSONE (DELTASONE) 20 mg tablet Take 40 mg by mouth daily for 5 days. With Breakfast, Print, Disp-10 Tab, R-0  
  
naloxone (NARCAN) 4 mg/actuation nasal spray Use 1 spray intranasally, then discard. Repeat with new spray every 2 min as needed for opioid overdose symptoms, alternating nostrils. , Print, Disp-2 Each, R-0  
  
  
CONTINUE these medications which have CHANGED Details  
hydroCHLOROthiazide (MICROZIDE) 12.5 mg capsule Take 1 Cap by mouth daily. , Print, Disp-30 Cap, R-5  
  
amLODIPine (NORVASC) 5 mg tablet Take 2 Tabs by mouth daily. , Print, Disp-30 Tab, R-5  
  
atorvastatin (LIPITOR) 40 mg tablet Take 1 Tab by mouth daily. , Print, Disp-30 Tab, R-5  
  
lisinopril (PRINIVIL, ZESTRIL) 40 mg tablet Take 1 Tab by mouth daily. , Normal, Disp-30 Tab, R-5  
  
metoprolol tartrate (LOPRESSOR) 100 mg IR tablet Take 100 in the am and 50 in the pm, Normal, Disp-60 Tab, R-5  
  
  
STOP taking these medications  
  
 chlorthalidone (HYGROTEN) 25 mg tablet Comments:  
Reason for Stoppin.  
Follow-up Information Follow up With Specialties Details Why Contact Info None    None ([phone]) Patient stated that they have no PCP Return to ED if worse Diagnosis Clinical Impression: 1. Cervical radiculopathy 2. High triglycerides Attestations: N/A

## 2019-03-03 ENCOUNTER — HOSPITAL ENCOUNTER (EMERGENCY)
Age: 34
Discharge: HOME | End: 2019-03-03
Attending: EMERGENCY MEDICINE
Payer: MEDICAID

## 2019-03-03 ENCOUNTER — APPOINTMENT (OUTPATIENT)
Dept: CT IMAGING | Age: 34
End: 2019-03-03
Attending: EMERGENCY MEDICINE
Payer: MEDICAID

## 2019-03-03 ENCOUNTER — APPOINTMENT (OUTPATIENT)
Dept: GENERAL RADIOLOGY | Age: 34
End: 2019-03-03
Payer: MEDICAID

## 2019-03-03 VITALS
SYSTOLIC BLOOD PRESSURE: 161 MMHG | BODY MASS INDEX: 33.4 KG/M2 | WEIGHT: 225.53 LBS | RESPIRATION RATE: 11 BRPM | TEMPERATURE: 98.3 F | HEART RATE: 55 BPM | HEIGHT: 69 IN | DIASTOLIC BLOOD PRESSURE: 103 MMHG | OXYGEN SATURATION: 96 %

## 2019-03-03 DIAGNOSIS — I10 UNCONTROLLED HYPERTENSION: ICD-10-CM

## 2019-03-03 DIAGNOSIS — M54.6 ACUTE BILATERAL THORACIC BACK PAIN: Primary | ICD-10-CM

## 2019-03-03 DIAGNOSIS — M79.2 RADICULAR PAIN IN LEFT ARM: ICD-10-CM

## 2019-03-03 LAB
ALBUMIN SERPL-MCNC: 4.3 G/DL (ref 3.5–5)
ALBUMIN/GLOB SERPL: 1.1 {RATIO} (ref 1.1–2.2)
ALP SERPL-CCNC: 54 U/L (ref 45–117)
ALT SERPL-CCNC: 19 U/L (ref 12–78)
AMPHET UR QL SCN: NEGATIVE
ANION GAP SERPL CALC-SCNC: 7 MMOL/L (ref 5–15)
APPEARANCE UR: CLEAR
AST SERPL-CCNC: 15 U/L (ref 15–37)
BACTERIA URNS QL MICRO: ABNORMAL /HPF
BARBITURATES UR QL SCN: NEGATIVE
BASOPHILS # BLD: 0 K/UL (ref 0–0.1)
BASOPHILS NFR BLD: 1 % (ref 0–1)
BENZODIAZ UR QL: POSITIVE
BILIRUB SERPL-MCNC: 0.7 MG/DL (ref 0.2–1)
BILIRUB UR QL: NEGATIVE
BUN SERPL-MCNC: 7 MG/DL (ref 6–20)
BUN/CREAT SERPL: 7 (ref 12–20)
CALCIUM SERPL-MCNC: 9.1 MG/DL (ref 8.5–10.1)
CANNABINOIDS UR QL SCN: POSITIVE
CHLORIDE SERPL-SCNC: 101 MMOL/L (ref 97–108)
CK SERPL-CCNC: 239 U/L (ref 39–308)
CO2 SERPL-SCNC: 28 MMOL/L (ref 21–32)
COCAINE UR QL SCN: NEGATIVE
COLOR UR: ABNORMAL
CREAT SERPL-MCNC: 1.03 MG/DL (ref 0.7–1.3)
DIFFERENTIAL METHOD BLD: NORMAL
DRUG SCRN COMMENT,DRGCM: ABNORMAL
EOSINOPHIL # BLD: 0.1 K/UL (ref 0–0.4)
EOSINOPHIL NFR BLD: 1 % (ref 0–7)
EPITH CASTS URNS QL MICRO: ABNORMAL /LPF
ERYTHROCYTE [DISTWIDTH] IN BLOOD BY AUTOMATED COUNT: 12.3 % (ref 11.5–14.5)
GLOBULIN SER CALC-MCNC: 4 G/DL (ref 2–4)
GLUCOSE SERPL-MCNC: 79 MG/DL (ref 65–100)
GLUCOSE UR STRIP.AUTO-MCNC: NEGATIVE MG/DL
HCT VFR BLD AUTO: 46.8 % (ref 36.6–50.3)
HGB BLD-MCNC: 15.7 G/DL (ref 12.1–17)
HGB UR QL STRIP: NEGATIVE
HYALINE CASTS URNS QL MICRO: ABNORMAL /LPF (ref 0–5)
IMM GRANULOCYTES # BLD AUTO: 0 K/UL (ref 0–0.04)
IMM GRANULOCYTES NFR BLD AUTO: 0 % (ref 0–0.5)
KETONES UR QL STRIP.AUTO: NEGATIVE MG/DL
LEUKOCYTE ESTERASE UR QL STRIP.AUTO: NEGATIVE
LYMPHOCYTES # BLD: 1.4 K/UL (ref 0.8–3.5)
LYMPHOCYTES NFR BLD: 22 % (ref 12–49)
MCH RBC QN AUTO: 28.6 PG (ref 26–34)
MCHC RBC AUTO-ENTMCNC: 33.5 G/DL (ref 30–36.5)
MCV RBC AUTO: 85.4 FL (ref 80–99)
METHADONE UR QL: NEGATIVE
MONOCYTES # BLD: 0.4 K/UL (ref 0–1)
MONOCYTES NFR BLD: 7 % (ref 5–13)
NEUTS SEG # BLD: 4.2 K/UL (ref 1.8–8)
NEUTS SEG NFR BLD: 69 % (ref 32–75)
NITRITE UR QL STRIP.AUTO: NEGATIVE
NRBC # BLD: 0 K/UL (ref 0–0.01)
NRBC BLD-RTO: 0 PER 100 WBC
OPIATES UR QL: POSITIVE
PCP UR QL: NEGATIVE
PH UR STRIP: 7 [PH] (ref 5–8)
PLATELET # BLD AUTO: 243 K/UL (ref 150–400)
PMV BLD AUTO: 10 FL (ref 8.9–12.9)
POTASSIUM SERPL-SCNC: 3.7 MMOL/L (ref 3.5–5.1)
PROT SERPL-MCNC: 8.3 G/DL (ref 6.4–8.2)
PROT UR STRIP-MCNC: NEGATIVE MG/DL
RBC # BLD AUTO: 5.48 M/UL (ref 4.1–5.7)
RBC #/AREA URNS HPF: ABNORMAL /HPF (ref 0–5)
SODIUM SERPL-SCNC: 136 MMOL/L (ref 136–145)
SP GR UR REFRACTOMETRY: 1.01 (ref 1–1.03)
TROPONIN I SERPL-MCNC: <0.05 NG/ML
UA: UC IF INDICATED,UAUC: ABNORMAL
UROBILINOGEN UR QL STRIP.AUTO: 1 EU/DL (ref 0.2–1)
WBC # BLD AUTO: 6.2 K/UL (ref 4.1–11.1)
WBC URNS QL MICRO: ABNORMAL /HPF (ref 0–4)

## 2019-03-03 PROCEDURE — 96372 THER/PROPH/DIAG INJ SC/IM: CPT

## 2019-03-03 PROCEDURE — 71275 CT ANGIOGRAPHY CHEST: CPT

## 2019-03-03 PROCEDURE — 87086 URINE CULTURE/COLONY COUNT: CPT

## 2019-03-03 PROCEDURE — 81001 URINALYSIS AUTO W/SCOPE: CPT

## 2019-03-03 PROCEDURE — 99285 EMERGENCY DEPT VISIT HI MDM: CPT

## 2019-03-03 PROCEDURE — 74011250637 HC RX REV CODE- 250/637: Performed by: PHYSICIAN ASSISTANT

## 2019-03-03 PROCEDURE — 82550 ASSAY OF CK (CPK): CPT

## 2019-03-03 PROCEDURE — 71045 X-RAY EXAM CHEST 1 VIEW: CPT

## 2019-03-03 PROCEDURE — 84484 ASSAY OF TROPONIN QUANT: CPT

## 2019-03-03 PROCEDURE — 74011636320 HC RX REV CODE- 636/320: Performed by: EMERGENCY MEDICINE

## 2019-03-03 PROCEDURE — 36415 COLL VENOUS BLD VENIPUNCTURE: CPT

## 2019-03-03 PROCEDURE — 74011250636 HC RX REV CODE- 250/636

## 2019-03-03 PROCEDURE — 80307 DRUG TEST PRSMV CHEM ANLYZR: CPT

## 2019-03-03 PROCEDURE — 80053 COMPREHEN METABOLIC PANEL: CPT

## 2019-03-03 PROCEDURE — 93005 ELECTROCARDIOGRAM TRACING: CPT

## 2019-03-03 PROCEDURE — 85025 COMPLETE CBC W/AUTO DIFF WBC: CPT

## 2019-03-03 RX ORDER — SODIUM CHLORIDE 0.9 % (FLUSH) 0.9 %
5-40 SYRINGE (ML) INJECTION AS NEEDED
Status: DISCONTINUED | OUTPATIENT
Start: 2019-03-03 | End: 2019-03-03 | Stop reason: HOSPADM

## 2019-03-03 RX ORDER — MELOXICAM 15 MG/1
15 TABLET ORAL DAILY
Qty: 10 TAB | Refills: 0 | Status: SHIPPED | OUTPATIENT
Start: 2019-03-03 | End: 2019-03-13

## 2019-03-03 RX ORDER — MORPHINE SULFATE 2 MG/ML
INJECTION, SOLUTION INTRAMUSCULAR; INTRAVENOUS
Status: COMPLETED
Start: 2019-03-03 | End: 2019-03-03

## 2019-03-03 RX ORDER — PREDNISONE 20 MG/1
60 TABLET ORAL DAILY
Qty: 15 TAB | Refills: 0 | Status: SHIPPED | OUTPATIENT
Start: 2019-03-03 | End: 2019-03-08

## 2019-03-03 RX ORDER — SODIUM CHLORIDE 0.9 % (FLUSH) 0.9 %
5-40 SYRINGE (ML) INJECTION EVERY 8 HOURS
Status: DISCONTINUED | OUTPATIENT
Start: 2019-03-03 | End: 2019-03-03 | Stop reason: HOSPADM

## 2019-03-03 RX ORDER — ONDANSETRON 4 MG/1
4 TABLET, ORALLY DISINTEGRATING ORAL
Status: COMPLETED | OUTPATIENT
Start: 2019-03-03 | End: 2019-03-03

## 2019-03-03 RX ORDER — DIAZEPAM 5 MG/1
5 TABLET ORAL
Status: COMPLETED | OUTPATIENT
Start: 2019-03-03 | End: 2019-03-03

## 2019-03-03 RX ORDER — METHOCARBAMOL 750 MG/1
1500 TABLET, FILM COATED ORAL 4 TIMES DAILY
Qty: 32 TAB | Refills: 0 | Status: SHIPPED | OUTPATIENT
Start: 2019-03-03 | End: 2019-03-07

## 2019-03-03 RX ORDER — CLONIDINE HYDROCHLORIDE 0.1 MG/1
0.1 TABLET ORAL
Status: COMPLETED | OUTPATIENT
Start: 2019-03-03 | End: 2019-03-03

## 2019-03-03 RX ORDER — MORPHINE SULFATE 2 MG/ML
2 INJECTION, SOLUTION INTRAMUSCULAR; INTRAVENOUS
Status: COMPLETED | OUTPATIENT
Start: 2019-03-03 | End: 2019-03-03

## 2019-03-03 RX ORDER — OXYCODONE HYDROCHLORIDE 5 MG/1
5 TABLET ORAL
Qty: 15 TAB | Refills: 0 | Status: SHIPPED | OUTPATIENT
Start: 2019-03-03 | End: 2019-03-08

## 2019-03-03 RX ORDER — OXYCODONE AND ACETAMINOPHEN 5; 325 MG/1; MG/1
1 TABLET ORAL
Status: COMPLETED | OUTPATIENT
Start: 2019-03-03 | End: 2019-03-03

## 2019-03-03 RX ORDER — SODIUM CHLORIDE 0.9 % (FLUSH) 0.9 %
10 SYRINGE (ML) INJECTION
Status: COMPLETED | OUTPATIENT
Start: 2019-03-03 | End: 2019-03-03

## 2019-03-03 RX ADMIN — MORPHINE SULFATE 2 MG: 2 INJECTION, SOLUTION INTRAMUSCULAR; INTRAVENOUS at 13:40

## 2019-03-03 RX ADMIN — CLONIDINE HYDROCHLORIDE 0.1 MG: 0.1 TABLET ORAL at 14:19

## 2019-03-03 RX ADMIN — OXYCODONE AND ACETAMINOPHEN 1 TABLET: 5; 325 TABLET ORAL at 14:19

## 2019-03-03 RX ADMIN — DIAZEPAM 5 MG: 5 TABLET ORAL at 13:38

## 2019-03-03 RX ADMIN — IOPAMIDOL 100 ML: 755 INJECTION, SOLUTION INTRAVENOUS at 18:57

## 2019-03-03 RX ADMIN — Medication 10 ML: at 18:57

## 2019-03-03 RX ADMIN — ONDANSETRON 4 MG: 4 TABLET, ORALLY DISINTEGRATING ORAL at 13:38

## 2019-03-03 RX ADMIN — CLONIDINE HYDROCHLORIDE 0.1 MG: 0.1 TABLET ORAL at 16:18

## 2019-03-03 NOTE — ED PROVIDER NOTES
EMERGENCY DEPARTMENT HISTORY AND PHYSICAL EXAM 
 
 
Date: 3/3/2019 Patient Name: Alvaro Lagos. History of Presenting Illness Chief Complaint Patient presents with  Back Pain Upper back pain that has been ongoing since seen here in November for same, denies recent injury or trauma History Provided By: Patient HPI: Alvaro Lagos., 26 y.o. male presents ambulatory to the ED with c/o thoracic back pain over the last week. Pt states he was seen for similar sx in November and has had some chronic pain since that time, but this intensified over the last week. Pt states the pain is reproduced with movement, palpation. He denied injury/strain since seen last.  He denied chest pain or shortness of breath but states the pain radiates into the L UE. No rash or lesion noted. No numbness/tingling/weakness. He reports similar sx when seen in November. He has seen a Chiropractor in the past but denied seeing Orthopedics or Neurosurgery. Pt states he has taken his blood pressure medications this am as directed. Pt is o/w healthy without fever, chills, cough, congestion, ST, N/V/D. Chief Complaint: back pain radiating into his L shoulder Duration: 1 Weeks Timing:  Acute Location: thoracic back pain Quality: Aching Severity: Severe Modifying Factors: pain intensifies with palpation/movement Associated Symptoms: denies any other associated signs or symptoms There are no other complaints, changes, or physical findings at this time. PCP: None Current Outpatient Medications Medication Sig Dispense Refill  methocarbamol (ROBAXIN) 750 mg tablet Take 2 Tabs by mouth four (4) times daily for 4 days. 32 Tab 0  
 oxyCODONE IR (ROXICODONE) 5 mg immediate release tablet Take 1 Tab by mouth every eight (8) hours as needed for Pain for up to 5 days. Max Daily Amount: 15 mg. 15 Tab 0  predniSONE (DELTASONE) 20 mg tablet Take 60 mg by mouth daily for 5 days.  15 Tab 0  
  meloxicam (MOBIC) 15 mg tablet Take 1 Tab by mouth daily for 10 days. 10 Tab 0  
 hydroCHLOROthiazide (MICROZIDE) 12.5 mg capsule Take 1 Cap by mouth daily. 30 Cap 5  
 amLODIPine (NORVASC) 5 mg tablet Take 2 Tabs by mouth daily. 30 Tab 5  
 atorvastatin (LIPITOR) 40 mg tablet Take 1 Tab by mouth daily. 30 Tab 5  
 naloxone (NARCAN) 4 mg/actuation nasal spray Use 1 spray intranasally, then discard. Repeat with new spray every 2 min as needed for opioid overdose symptoms, alternating nostrils. 2 Each 0  
 metoprolol tartrate (LOPRESSOR) 100 mg IR tablet Take 100 in the am and 50 in the pm 60 Tab 5  
 lisinopril (PRINIVIL, ZESTRIL) 40 mg tablet Take 1 Tab by mouth daily. 30 Tab 5 Past History Past Medical History: 
Past Medical History:  
Diagnosis Date  Hypertension Past Surgical History: 
History reviewed. No pertinent surgical history. Family History: 
Family History Problem Relation Age of Onset  Hypertension Mother  Hypertension Father Social History: 
Social History Tobacco Use  Smoking status: Former Smoker Packs/day: 0.50 Years: 4.00 Pack years: 2.00 Last attempt to quit: 2014 Years since quittin.8  Smokeless tobacco: Never Used Substance Use Topics  Alcohol use: Yes Comment: rarely  Drug use: Yes Frequency: 14.0 times per week Types: Marijuana Comment: last uesed  Allergies: 
No Known Allergies Review of Systems Review of Systems Constitutional: Negative for chills and fever. HENT: Negative for congestion, rhinorrhea and sore throat. Respiratory: Negative for cough and shortness of breath. Cardiovascular: Negative for chest pain and palpitations. Gastrointestinal: Negative for abdominal pain, diarrhea, nausea and vomiting. Endocrine: Negative for polydipsia, polyphagia and polyuria. Genitourinary: Negative for dysuria and hematuria. Musculoskeletal: Positive for arthralgias, back pain and neck pain. Negative for neck stiffness. Skin: Negative for rash and wound. Allergic/Immunologic: Negative for food allergies and immunocompromised state. Neurological: Negative for dizziness, weakness, numbness and headaches. Hematological: Negative for adenopathy. Does not bruise/bleed easily. Psychiatric/Behavioral: Negative for agitation and confusion. Physical Exam  
Physical Exam  
Constitutional: He is oriented to person, place, and time. He appears well-developed and well-nourished. No distress. WDWN AA male, alert, in notable discomfort HENT:  
Head: Normocephalic and atraumatic. Nose: Nose normal.  
Mouth/Throat: Oropharynx is clear and moist. No oropharyngeal exudate. Eyes: Conjunctivae and EOM are normal. Pupils are equal, round, and reactive to light. Right eye exhibits no discharge. Left eye exhibits no discharge. No scleral icterus. Neck: Normal range of motion. Neck supple. No JVD present. No tracheal deviation present. No thyromegaly present. Moderate paracervical and superior trapezius tenderness bilat, L > R. No midline spinal tenderness. 2+ radial pulses. NVI. Sensation grossly intact to light touch. Cardiovascular: Normal rate, regular rhythm and normal heart sounds. Pulmonary/Chest: Effort normal and breath sounds normal. No respiratory distress. He has no wheezes. He exhibits no tenderness. Abdominal: Soft. There is no tenderness. No CVAT Musculoskeletal: He exhibits tenderness. He exhibits no edema. See NECK exam  
Lymphadenopathy:  
  He has no cervical adenopathy. Neurological: He is alert and oriented to person, place, and time. He exhibits normal muscle tone. Coordination normal.  
Skin: Skin is warm and dry. No rash noted. He is not diaphoretic. No erythema. Psychiatric: He has a normal mood and affect. His behavior is normal. Judgment normal.  
Nursing note and vitals reviewed. Diagnostic Study Results Labs - No results found for this or any previous visit (from the past 12 hour(s)). Radiologic Studies - No orders to display Medical Decision Making I am the first provider for this patient. I reviewed the vital signs, available nursing notes, past medical history, past surgical history, family history and social history. Vital Signs-Reviewed the patient's vital signs. Patient Vitals for the past 12 hrs: 
 Temp Pulse Resp BP SpO2  
03/03/19 1411    (!) 204/137   
03/03/19 1235 98.2 °F (36.8 °C) 100 16 (!) 193/127 99 % EKG interpretation: (Preliminary) [phone] Rhythm: sinus bradycardia; and regular . Rate (approx.): 56; Axis: normal; NE interval: normal; QRS interval: normal ; ST/T wave: normal; Other findings: increased R/S ratio in V1, consider early transition or posterior infarct. Written by Andrew Levin ED scribe, as dictated by Edd Claude, MD. Records Reviewed: Nursing Notes, Old Medical Records, Previous Radiology Studies and Previous Laboratory Studies Provider Notes (Medical Decision Making):  
DDD, spasm, radicular pain, uncontrolled HTN 
 
 
ED Course:  
Initial assessment performed. The patients presenting problems have been discussed, and they are in agreement with the care plan formulated and outlined with them. I have encouraged them to ask questions as they arise throughout their visit. Case discussed (on multiple occasions) with Dr. Leodan Leong who advised no blood work or EKG necessary at this point. Will provide analgesia and Clonidine and reassess. If no improvement in HTN, will obtain labs, CXR, urine and consider Hydralazine. [address] Case d/w Dr. Cassidy Chacon who agreed with the plan. Will obtain labs, CXR, urine/urine drug screen. No need for Hydralazine at this point as blood pressure has improved. HTN COUNSELING Reviewed the risks of uncontrolled HTN to include stroke, heart attack, renal failure, aneurysm and death. They will take their medications daily and follow up with their PCP for recheck. DISCHARGE NOTE: 
8:35 PM 
The care plan has been outline with the patient and/or family, who verbally conveyed understanding and agreement. Available results have been reviewed. Patient and/or family understand the follow up plan as outlined and discharge instructions. Should their condition deterioration at any time after discharge the patient agrees to return, follow up sooner than outlined or seek medical assistance at the closest Emergency Room as soon as possible. Questions have been answered. Discharge instructions and educational information regarding the patient's diagnosis as well a list of reasons why the patient would want to seek immediate medical attention, should their condition change, were reviewed directly with the patient/family PLAN: 
1. Current Discharge Medication List  
  
START taking these medications Details  
methocarbamol (ROBAXIN) 750 mg tablet Take 2 Tabs by mouth four (4) times daily for 4 days. Qty: 32 Tab, Refills: 0  
  
oxyCODONE IR (ROXICODONE) 5 mg immediate release tablet Take 1 Tab by mouth every eight (8) hours as needed for Pain for up to 5 days. Max Daily Amount: 15 mg. 
Qty: 15 Tab, Refills: 0 Associated Diagnoses: Acute bilateral thoracic back pain  
  
predniSONE (DELTASONE) 20 mg tablet Take 60 mg by mouth daily for 5 days. Qty: 15 Tab, Refills: 0  
  
meloxicam (MOBIC) 15 mg tablet Take 1 Tab by mouth daily for 10 days. Qty: 10 Tab, Refills: 0  
  
  
 
2. Follow-up Information Follow up With Specialties Details Why Contact Info  
 your primary care Vanessa Frances MD Orthopedic Surgery  As needed [address] [facility] 
[phone] [facility] EMERGENCY DEPT Emergency Medicine  If symptoms worsen [address] [facility] [address] [place] [address] 
[phone] Return to ED if worse Diagnosis Clinical Impression: 1. Acute bilateral thoracic back pain 2. Radicular pain in left arm 3. Essential hypertension This note will not be viewable in [address]

## 2019-03-03 NOTE — LETTER
[facility] EMERGENCY DEPT 
[address] [place] [address] [address] [id number] [phone] Work/School Note Date: 3/3/2019 To Whom It May concern: 
 
Roxana Glass. was seen and treated today in the emergency room. He may return to work in 2 to 3 days, as symptoms improve. Sincerely, Arti Lora, [address]

## 2019-03-03 NOTE — DISCHARGE INSTRUCTIONS
Rest, ice/cool compresses several times daily x 2 days, then alternate ice/moist heat, gentle stretching, massage. Avoid prolonged sitting. Follow up with primary care for recheck of current symptoms/blood pressure. Contact information provided for Orthopedist if symptoms persist.  Return to the Emergency Dept for any continued/worsening pain. Patient Education        Back Stretches: Exercises  Your Care Instructions  Here are some examples of exercises for stretching your back. Start each exercise slowly. Ease off the exercise if you start to have pain. Your doctor or physical therapist will tell you when you can start these exercises and which ones will work best for you. How to do the exercises  Overhead stretch    1. Stand comfortably with your feet shoulder-width apart. 2. Looking straight ahead, raise both arms over your head and reach toward the ceiling. Do not allow your head to tilt back. 3. Hold for 15 to 30 seconds, then lower your arms to your sides. 4. Repeat 2 to 4 times. Side stretch    1. Stand comfortably with your feet shoulder-width apart. 2. Raise one arm over your head, and then lean to the other side. 3. Slide your hand down your leg as you let the weight of your arm gently stretch your side muscles. Hold for 15 to 30 seconds. 4. Repeat 2 to 4 times on each side. Press-up    1. Lie on your stomach, supporting your body with your forearms. 2. Press your elbows down into the floor to raise your upper back. As you do this, relax your stomach muscles and allow your back to arch without using your back muscles. As your press up, do not let your hips or pelvis come off the floor. 3. Hold for 15 to 30 seconds, then relax. 4. Repeat 2 to 4 times. Relax and rest    1. Lie on your back with a rolled towel under your neck and a pillow under your knees. Extend your arms comfortably to your sides. 2. Relax and breathe normally.   3. Remain in this position for about 10 minutes. 4. If you can, do this 2 or 3 times each day. Follow-up care is a key part of your treatment and safety. Be sure to make and go to all appointments, and call your doctor if you are having problems. It's also a good idea to know your test results and keep a list of the medicines you take. Where can you learn more? Go to http://nakul-chel.info/. Enter V014 in the search box to learn more about \"Back Stretches: Exercises. \"  Current as of: September 20, 2018  Content Version: 11.9  © 9912-6182 [employer] Care instructions adapted under license by [employer] (which disclaims liability or warranty for this information). If you have questions about a medical condition or this instruction, always ask your healthcare professional. [address] any warranty or liability for your use of this information. Patient Education        Learning About High Blood Pressure  What is high blood pressure? Blood pressure is a measure of how hard the blood pushes against the walls of your arteries. It's normal for blood pressure to go up and down throughout the day, but if it stays up, you have high blood pressure. Another name for high blood pressure is hypertension. Two numbers tell you your blood pressure. The first number is the systolic pressure. It shows how hard the blood pushes when your heart is pumping. The second number is the diastolic pressure. It shows how hard the blood pushes between heartbeats, when your heart is relaxed and filling with blood. Your doctor will give you a goal for your blood pressure. Your goal will be based on your health and your age. High blood pressure (hypertension) means that the top number stays high, or the bottom number stays high, or both. High blood pressure increases the risk of stroke, heart attack, and other problems.  You and your doctor will talk about your risks of these problems based on your blood pressure. What happens when you have high blood pressure? · Blood flows through your arteries with too much force. Over time, this damages the walls of your arteries. But you can't feel it. High blood pressure usually doesn't cause symptoms. · Fat and calcium start to build up in your arteries. This buildup is called plaque. Plaque makes your arteries narrower and stiffer. Blood can't flow through them as easily. · This lack of good blood flow starts to damage some of the organs in your body. This can lead to problems such as coronary artery disease and heart attack, heart failure, stroke, kidney failure, and eye damage. How can you prevent high blood pressure? · Stay at a healthy weight. · Try to limit how much sodium you eat to less than 2,300 milligrams (mg) a day. If you limit your sodium to 1,500 mg a day, you can lower your blood pressure even more. ? Buy foods that are labeled \"unsalted,\" \"sodium-free,\" or \"low-sodium. \" Foods labeled \"reduced-sodium\" and \"light sodium\" may still have too much sodium. ? Flavor your food with garlic, lemon juice, onion, vinegar, herbs, and spices instead of salt. Do not use soy sauce, steak sauce, onion salt, garlic salt, mustard, or ketchup on your food. ? Use less salt (or none) when recipes call for it. You can often use half the salt a recipe calls for without losing flavor. · Be physically active. Get at least 30 minutes of exercise on most days of the week. Walking is a good choice. You also may want to do other activities, such as running, swimming, cycling, or playing tennis or team sports. · Limit alcohol to 2 drinks a day for men and 1 drink a day for women. · Eat plenty of fruits, vegetables, and low-fat dairy products. Eat less saturated and total fats. How is high blood pressure treated? · Your doctor will suggest making lifestyle changes.  For example, your doctor may ask you to eat healthy foods, quit smoking, lose extra weight, and be more active. · If lifestyle changes don't help enough, your doctor may recommend that you take medicine. · When blood pressure is very high, medicines are needed to lower it. Follow-up care is a key part of your treatment and safety. Be sure to make and go to all appointments, and call your doctor if you are having problems. It's also a good idea to know your test results and keep a list of the medicines you take. Where can you learn more? Go to http://nakul-chel.info/. Enter P501 in the search box to learn more about \"Learning About High Blood Pressure. \"  Current as of: July 22, 2018  Content Version: 11.9  © 7335-2445 [employer]. Care instructions adapted under license by [employer] (which disclaims liability or warranty for this information). If you have questions about a medical condition or this instruction, always ask your healthcare professional. [address] any warranty or liability for your use of this information.

## 2019-03-03 NOTE — ED NOTES
Pt reports shoulder pain that radiates down his arm (sometimes to fingertips). Pain is sharp. Pt notes pain on anterior and posterior shoulder. Pt was seen here last November for similar complaints and was discharged with valium and hydrocodone. Pt took tylenol at 9am this morning for pain.

## 2019-03-04 LAB
ATRIAL RATE: 56 BPM
CALCULATED P AXIS, ECG09: 65 DEGREES
CALCULATED R AXIS, ECG10: 61 DEGREES
CALCULATED T AXIS, ECG11: 41 DEGREES
DIAGNOSIS, 93000: NORMAL
P-R INTERVAL, ECG05: 172 MS
Q-T INTERVAL, ECG07: 424 MS
QRS DURATION, ECG06: 90 MS
QTC CALCULATION (BEZET), ECG08: 409 MS
VENTRICULAR RATE, ECG03: 56 BPM

## 2019-03-04 NOTE — ED NOTES
Bedside and Verbal shift change report given to Melanie Cunningham RN (oncoming nurse) by this RN and Brandy Franks RN (offgoing nurse). Report included the following information SBAR.

## 2019-03-04 NOTE — ED NOTES
Care assumed from outgoing RN. Pt lying in stretcher NAD. C/o upper back pain chronic in nature. Denies CP or SOB at this time. Speaking in normal sentences.  Awaiting CT chest results and MD reeval.

## 2019-03-04 NOTE — ED NOTES
Dr. Noé Call presented and explained discharge instructions. Pt left prior to this RN verified understanding of treatment and signs and symptoms requiring a return visit.

## 2019-03-05 LAB
BACTERIA SPEC CULT: NORMAL
CC UR VC: NORMAL
SERVICE CMNT-IMP: NORMAL

## 2022-03-19 PROBLEM — E66.01 SEVERE OBESITY (BMI 35.0-35.9 WITH COMORBIDITY): Status: ACTIVE | Noted: 2017-02-13

## 2023-05-30 NOTE — DISCHARGE INSTRUCTIONS
to bedside.   [facility] SYSTEMS Departments     For adult and child immunizations, family planning, TB screening, STD testing and women's health services. [facility]: [place] [phone]      [facility] 25   [address]   [address]   170 [facility]: Barbra Duggan [address] [facility] [address] [phone]      [address]          [address] [place] [address]     For primary care services, woman and child wellness, and some clinics providing specialty care. [facility] -- 101[facility]. [facility]5 [facility] [phone]   411 [facility] 200 [facility] 3614 [facility] [phone]   339 [facility] [address] [phone] [address]  [employer] [address] [place] [address] 5850  Community  [phone]   [facility] [address] [address] [place] [phone]   [facility] 81 [facility] [phone]   [facility] [place] [address] [place] [address] [phone]   [facility]: [facility] [address] [place] [address], [address] [phone]     [address] [phone]   [facility] [address]  Community  [phone]   [employer]  [address] ([email or web address]) [phone]         Sexual Health/Woman Wellness Clinics    For STD/HIV testing and treatment, pregnancy testing and services, men's health, birth control services, LGBT services, and hepatitis/HPV vaccine services. [employer] [facility] [employer] 201 [address]. [facility] [address] [facility] [address] [facility] 1579 [address] [phone]   [facility] [address], 5th floor [phone]   Pregnancy [address] [facility] 118 JEANNE Torres [facility] [phone]         Specialty Service [address]    [phone]   [place]   [phone]   Women, Infant and Children's Services: [address] [phone] 600 [facility]   [phone]   [address]   [facility]     [phone]   [address] Crisis   [address] [phone]     Local Primary Care Physicians  [facility] [phone]  MD Madison Collazo MD Rick Shim, MD [facility] [phone]  Milady Ceja, FNP  Sarahy Sanchez, MD Azael Orellana MD [address] [place] [address] [phone]  [place] [address] [facility] [phone]  MD Cynthia Nunez MD Elia Lerner, MD Chuckie Brunswick, MD   [facility] [phone]  OAQ XVCPZM [place], MD Manjula Ames, NP [address] [phone]  MD Marielle Richmond, MD Urmila Pro President, MD Darlene Pallas, MD Cordell Lory, MD Lindy Fabian, MD   [address] 57 [facility]  Sj Dowling MD [address] [facility] [address] [phone]  MD Mag Qiu, NP  Juan Schulz, MD Mayte Collier, MD Nick Nassar MD Kearney Gable, MD   2242 [facility] [phone]  MD Jignesh Russell, FNP  Allison Steven, NP  MD Adrien Rodrigues MD Larita Fail, MD Cynda Pace, MD [facility] [phone]  MD Julissa Gay MD Elyse Fortune, MD Arie Walker MD   [address] [phone]  MD Fadi Gama MD [facility] [phone]  MD Travis Ruiz MD  Doniphan Dinora Cintron, [address] [facility] [phone]  Carmelina Seen, MD Chayito Salas, MD John Bar, MD Octavio Murphy, MD Andrea Stanton, MD Kiko Caballero, NP  My Velazco MD [address] [place], [facility]   [phone]  MD [place] Terrance Lopez, MD Cherry Barreto, MD DE LA CRUZ [facility] [phone]  MD Jennifer Montes, FNP  Isis Jacobson, PAGROVER Jacobson, FNP  Jasbir Burgess, ESTELLA Grossman, MD Melony Zee, NP   Denise Mccloud, DO Miscellaneous:  Ludin Rose [phone]

## 2025-04-10 ENCOUNTER — OFFICE VISIT (OUTPATIENT)
Age: 40
End: 2025-04-10

## 2025-04-10 VITALS
HEART RATE: 72 BPM | WEIGHT: 221 LBS | SYSTOLIC BLOOD PRESSURE: 167 MMHG | OXYGEN SATURATION: 97 % | RESPIRATION RATE: 18 BRPM | DIASTOLIC BLOOD PRESSURE: 100 MMHG | TEMPERATURE: 98.4 F

## 2025-04-10 DIAGNOSIS — K52.9 GASTROENTERITIS: ICD-10-CM

## 2025-04-10 DIAGNOSIS — I16.0 HYPERTENSIVE URGENCY: Primary | ICD-10-CM

## 2025-04-10 RX ORDER — ONDANSETRON 4 MG/1
4 TABLET, ORALLY DISINTEGRATING ORAL ONCE
Status: COMPLETED | OUTPATIENT
Start: 2025-04-10 | End: 2025-04-10

## 2025-04-10 RX ORDER — AMLODIPINE BESYLATE 10 MG/1
10 TABLET ORAL DAILY
Qty: 30 TABLET | Refills: 0 | Status: SHIPPED | OUTPATIENT
Start: 2025-04-10

## 2025-04-10 RX ORDER — ONDANSETRON 4 MG/1
4 TABLET, ORALLY DISINTEGRATING ORAL 3 TIMES DAILY PRN
Qty: 21 TABLET | Refills: 0 | Status: SHIPPED | OUTPATIENT
Start: 2025-04-10

## 2025-04-10 RX ADMIN — ONDANSETRON 4 MG: 4 TABLET, ORALLY DISINTEGRATING ORAL at 18:54

## 2025-04-10 NOTE — PATIENT INSTRUCTIONS
Exam and history consistent with Hypertensive Urgency and viral gastroenteritis.  -Start Amlodipine 10 mg   -Zofran as needed for nausea/vomiting  -Increase fluid intake to maintain hydration and to help fight infection. Please drink at least 64 ounces of fluid a day  -Ibuprofen 600 mg every 6 hours as needed and Tylenol 500 mg every 6 hours as needed for fever/pain  -Mucinex Fastmax, Tylenol severe cold and flu, or DayQuil for symptomatic relief and decongestion  -1 tablespoon of honey or mixed with hot tea for help with cough.  Recommend taking 30 minutes before sleep to help with cough at night  -Steam inhalation, warm compresses, humidifier, and warm soup can also help  -If you develop severely worsening headache, blurred vision, weakness, chest pain, shortness of breath, or inability to keep down fluids, please head to the Emergency Room immediately  -A list of [facility] offices   -Please follow-up with your PCP in the next 2 to 4 weeks with a daily blood pressure log    If symptoms persist or worsen, please contact PCP and/or return to urgent care.

## 2025-04-10 NOTE — PROGRESS NOTES
4/10/2025   Darien Bhandari Jr. (: 1985) is a 39 y.o. male, New patient, here for evaluation of the following chief complaint(s):  Vomiting (C/o vomiting and stomach pain. Sx started this morning. )     ASSESSMENT/PLAN:  Below is the assessment and plan developed based on review of pertinent history, physical exam, labs, studies, and medications.  Assessment & Plan  Hypertensive urgency       Orders:    amLODIPine (NORVASC) 10 MG tablet; Take 1 tablet by mouth daily  Exam and history consistent with Hypertensive Urgency and viral gastroenteritis.  -Start Amlodipine 10 mg   -Zofran as needed for nausea/vomiting  -Increase fluid intake to maintain hydration and to help fight infection. Please drink at least 64 ounces of fluid a day  -Ibuprofen 600 mg every 6 hours as needed and Tylenol 500 mg every 6 hours as needed for fever/pain  -Mucinex Fastmax, Tylenol severe cold and flu, or DayQuil for symptomatic relief and decongestion  -1 tablespoon of honey or mixed with hot tea for help with cough.  Recommend taking 30 minutes before sleep to help with cough at night  -Steam inhalation, warm compresses, humidifier, and warm soup can also help  -If you develop severely worsening headache, blurred vision, weakness, chest pain, shortness of breath, or inability to keep down fluids, please head to the Emergency Room immediately  -A list of [facility] Primary [facility] offices   -Please follow-up with your PCP in the next 2 to 4 weeks with a daily blood pressure log    If symptoms persist or worsen, please contact PCP and/or return to urgent care.    Gastroenteritis       Orders:    ondansetron (ZOFRAN-ODT) disintegrating tablet 4 mg    ondansetron (ZOFRAN-ODT) 4 MG disintegrating tablet; Take 1 tablet by mouth 3 times daily as needed for Nausea or Vomiting       Handout given with care instructions  2. OTC for symptom management. Increase fluid intake, ensure adequate nutritional intake.  3. Follow up with PCP as